# Patient Record
Sex: MALE | Race: WHITE | Employment: OTHER | URBAN - METROPOLITAN AREA
[De-identification: names, ages, dates, MRNs, and addresses within clinical notes are randomized per-mention and may not be internally consistent; named-entity substitution may affect disease eponyms.]

---

## 2017-10-04 PROBLEM — E66.9 MODERATE OBESITY: Status: ACTIVE | Noted: 2017-10-04

## 2017-10-04 PROBLEM — I25.10 CORONARY ARTERY DISEASE INVOLVING NATIVE CORONARY ARTERY OF NATIVE HEART WITHOUT ANGINA PECTORIS: Status: ACTIVE | Noted: 2017-10-04

## 2017-10-04 PROBLEM — I25.5 ISCHEMIC CARDIOMYOPATHY: Status: ACTIVE | Noted: 2017-10-04

## 2017-10-04 PROBLEM — E66.8 MODERATE OBESITY: Status: ACTIVE | Noted: 2017-10-04

## 2017-10-04 PROBLEM — E78.1 PURE HYPERGLYCERIDEMIA: Status: ACTIVE | Noted: 2017-10-04

## 2017-10-06 PROBLEM — J44.9 CHRONIC OBSTRUCTIVE PULMONARY DISEASE (HCC): Status: ACTIVE | Noted: 2017-10-06

## 2017-10-06 PROBLEM — E78.00 PURE HYPERCHOLESTEROLEMIA: Status: ACTIVE | Noted: 2017-10-06

## 2018-05-03 ENCOUNTER — HOSPITAL ENCOUNTER (OUTPATIENT)
Age: 58
Discharge: HOME | End: 2018-05-05
Payer: COMMERCIAL

## 2018-05-03 LAB — PROSTATE SPECIFIC ANTIGEN: 0.31 NG/ML (ref 0–4)

## 2018-05-03 PROCEDURE — G0103 PSA SCREENING: HCPCS

## 2018-07-16 RX ORDER — METOPROLOL SUCCINATE 25 MG/1
25 TABLET, EXTENDED RELEASE ORAL DAILY
Qty: 30 TABLET | Refills: 11 | Status: SHIPPED | OUTPATIENT
Start: 2018-07-16 | End: 2018-11-15 | Stop reason: SDUPTHER

## 2018-11-15 ENCOUNTER — OFFICE VISIT (OUTPATIENT)
Dept: CARDIOLOGY CLINIC | Age: 58
End: 2018-11-15
Payer: COMMERCIAL

## 2018-11-15 VITALS
DIASTOLIC BLOOD PRESSURE: 72 MMHG | HEART RATE: 70 BPM | WEIGHT: 266.2 LBS | SYSTOLIC BLOOD PRESSURE: 110 MMHG | RESPIRATION RATE: 18 BRPM | HEIGHT: 73 IN | BODY MASS INDEX: 35.28 KG/M2

## 2018-11-15 DIAGNOSIS — I51.9 LV DYSFUNCTION: ICD-10-CM

## 2018-11-15 DIAGNOSIS — I25.10 CORONARY ARTERY DISEASE INVOLVING NATIVE CORONARY ARTERY OF NATIVE HEART WITHOUT ANGINA PECTORIS: Primary | ICD-10-CM

## 2018-11-15 PROCEDURE — 99213 OFFICE O/P EST LOW 20 MIN: CPT | Performed by: CLINICAL NURSE SPECIALIST

## 2018-11-15 PROCEDURE — 93000 ELECTROCARDIOGRAM COMPLETE: CPT | Performed by: CLINICAL NURSE SPECIALIST

## 2018-11-15 RX ORDER — LISINOPRIL 10 MG/1
10 TABLET ORAL DAILY
Qty: 30 TABLET | Refills: 5 | Status: SHIPPED | OUTPATIENT
Start: 2018-11-15 | End: 2019-04-23 | Stop reason: SDUPTHER

## 2018-11-15 RX ORDER — TEMAZEPAM 15 MG/1
15 CAPSULE ORAL NIGHTLY PRN
COMMUNITY
End: 2021-04-20

## 2018-11-15 RX ORDER — METOPROLOL SUCCINATE 25 MG/1
25 TABLET, EXTENDED RELEASE ORAL DAILY
Qty: 30 TABLET | Refills: 5 | Status: SHIPPED | OUTPATIENT
Start: 2018-11-15 | End: 2019-04-23 | Stop reason: SDUPTHER

## 2018-11-15 RX ORDER — ATORVASTATIN CALCIUM 40 MG/1
40 TABLET, FILM COATED ORAL DAILY
Qty: 30 TABLET | Refills: 5 | Status: SHIPPED | OUTPATIENT
Start: 2018-11-15 | End: 2019-04-23 | Stop reason: SDUPTHER

## 2018-11-15 RX ORDER — IBUPROFEN 800 MG/1
800 TABLET ORAL EVERY 6 HOURS PRN
COMMUNITY
End: 2020-05-27

## 2019-02-08 ENCOUNTER — TELEPHONE (OUTPATIENT)
Dept: CARDIOLOGY CLINIC | Age: 59
End: 2019-02-08

## 2019-04-23 DIAGNOSIS — I51.9 LV DYSFUNCTION: ICD-10-CM

## 2019-04-23 DIAGNOSIS — I25.10 CORONARY ARTERY DISEASE INVOLVING NATIVE CORONARY ARTERY OF NATIVE HEART WITHOUT ANGINA PECTORIS: ICD-10-CM

## 2019-04-23 RX ORDER — ATORVASTATIN CALCIUM 40 MG/1
40 TABLET, FILM COATED ORAL DAILY
Qty: 30 TABLET | Refills: 11 | Status: SHIPPED
Start: 2019-04-23 | End: 2020-05-18 | Stop reason: SDUPTHER

## 2019-04-23 RX ORDER — METOPROLOL SUCCINATE 25 MG/1
25 TABLET, EXTENDED RELEASE ORAL DAILY
Qty: 30 TABLET | Refills: 11 | Status: SHIPPED
Start: 2019-04-23 | End: 2020-05-11

## 2019-04-23 RX ORDER — LISINOPRIL 10 MG/1
10 TABLET ORAL DAILY
Qty: 30 TABLET | Refills: 11 | Status: SHIPPED
Start: 2019-04-23 | End: 2020-05-18 | Stop reason: SDUPTHER

## 2019-06-03 ENCOUNTER — TELEPHONE (OUTPATIENT)
Dept: CARDIOLOGY CLINIC | Age: 59
End: 2019-06-03

## 2019-06-03 NOTE — TELEPHONE ENCOUNTER
Patient has coronary disease and is a definite candidate to continue aspirin and blood pressures are not concerning cause changes of therapy at this time.

## 2019-06-03 NOTE — TELEPHONE ENCOUNTER
Patient was seen by his PC, Dr Rachid Krishnan. He was told to check with his cardiologist to see if he still needed to continue taking ASA 81mg daily. There is a new study out and they advised it is not necessary for everyone to ASA. Also, he has noticed that his blood pressure runs \"abnormally low\"    He is wondering if he needs to take lisinopril? He took his pressure at the pharmacy machine and it has read 90/60, this was after walking to the back of the store. He does not have symptoms, no dizziness. He has a lot of trouble sleeping at night. He has a lot of stress and it keeps him awake. He was prescribed Restoril 15mg, he takes it prn. He was asking if he needs to be concerned about his blood pressure.     [phone]

## 2019-07-10 ENCOUNTER — OFFICE VISIT (OUTPATIENT)
Dept: PHYSICAL MEDICINE AND REHAB | Age: 59
End: 2019-07-10
Payer: COMMERCIAL

## 2019-07-10 VITALS — HEIGHT: 73 IN | WEIGHT: 260 LBS | BODY MASS INDEX: 34.46 KG/M2

## 2019-07-10 DIAGNOSIS — R20.2 NUMBNESS AND TINGLING IN LEFT HAND: Primary | ICD-10-CM

## 2019-07-10 DIAGNOSIS — R20.0 NUMBNESS AND TINGLING IN LEFT HAND: Primary | ICD-10-CM

## 2019-07-10 PROCEDURE — 3017F COLORECTAL CA SCREEN DOC REV: CPT | Performed by: PHYSICAL MEDICINE & REHABILITATION

## 2019-07-10 PROCEDURE — G8417 CALC BMI ABV UP PARAM F/U: HCPCS | Performed by: PHYSICAL MEDICINE & REHABILITATION

## 2019-07-10 PROCEDURE — G8598 ASA/ANTIPLAT THER USED: HCPCS | Performed by: PHYSICAL MEDICINE & REHABILITATION

## 2019-07-10 PROCEDURE — 95912 NRV CNDJ TEST 11-12 STUDIES: CPT | Performed by: PHYSICAL MEDICINE & REHABILITATION

## 2019-07-10 PROCEDURE — 95886 MUSC TEST DONE W/N TEST COMP: CPT | Performed by: PHYSICAL MEDICINE & REHABILITATION

## 2019-07-10 PROCEDURE — 4004F PT TOBACCO SCREEN RCVD TLK: CPT | Performed by: PHYSICAL MEDICINE & REHABILITATION

## 2019-07-10 PROCEDURE — 99202 OFFICE O/P NEW SF 15 MIN: CPT | Performed by: PHYSICAL MEDICINE & REHABILITATION

## 2019-07-10 PROCEDURE — G8427 DOCREV CUR MEDS BY ELIG CLIN: HCPCS | Performed by: PHYSICAL MEDICINE & REHABILITATION

## 2019-07-10 NOTE — PROGRESS NOTES
[address] [facility]  Electrodiagnostic Laboratory  *Accredited by the [address] [place] [address] with exemplary status  [address] [facility] [address]  Phone: [phone]  Fax: [phone]    Referring Provider: Serg Flores DO  Primary Care Physician: Aguilar Campuzano DO  Patient Name: Tata Gonzalez  Patient YOB: 1960  Gender: male  BMI: Body mass index is 34.3 kg/m². Height 6' 1\" (1.854 m), weight 260 lb (117.9 kg). 7/10/2019    Description of clinical problem:   Chief Complaint   Patient presents with    Hand Numbness     Left hand numbness when he rests his elbow on a surface      Pain No  Pain Score:   0 - No pain; Numbness/tingling  Intermittent; Weakness  No       Brief physical exam:   Sensory deficit No; Weakness No; Atrophy  No; Reflex abnormality  No    Sensory NCS      Nerve / Sites Rec. Site Peak Lat PP Amp Segments Distance Velocity Temp. ms µV  cm m/s °C   L Median - Digit II (Antidromic)      Palm Dig II 2.08 22.7 Palm - Dig II 7 48 32.6      Wrist Dig II 3.91 15.9 Wrist - Dig II 14 45 32.6   R Median - Digit II (Antidromic)      Palm Dig II 1.98 28.9 Palm - Dig II 7 56 33.6      Wrist Dig II 3.54 26.1 Wrist - Dig II 14 56 33.6   L Ulnar - Digit V (Antidromic)      Wrist Dig V 3.02 16.5 Wrist - Dig V 14 61 33.6   L Radial - Anatomical snuff box (Forearm)      Forearm Wrist 2.19 15.9 Forearm - Wrist 10 62 33.5   L Dorsal ulnar cutaneous - Hand dorsum (Forearm)      Forearm Hand dorsum 1.77 20.0 Forearm - Hand dorsum 8 57 33.6       Combined Sensory Index      Nerve / Sites Rec. Site Peak Lat NP Amp PP Amp Segments Dist. Peak Diff Temp.      ms µV µV  cm ms °C   L Median - CSI      Median Thumb 3.23 9.5 12.9 Median - Radial 10 0.94 33.5      Radial Thumb 2.29 6.2 6.9 Median - Ulnar 14        CSI     CSI  0.94*    R Median - CSI      Median Thumb 3.23 8.4 17.4 Median - Radial 10 0.99 33.2      Radial Thumb 2.24 5.9 6.7 Median - Ulnar 14        CSI     CSI  0.99*

## 2019-07-16 DIAGNOSIS — R20.0 NUMBNESS AND TINGLING IN LEFT HAND: ICD-10-CM

## 2019-07-16 DIAGNOSIS — R20.2 NUMBNESS AND TINGLING IN LEFT HAND: ICD-10-CM

## 2019-11-22 ENCOUNTER — HOSPITAL ENCOUNTER (OUTPATIENT)
Age: 59
Discharge: HOME | End: 2019-11-24
Payer: COMMERCIAL

## 2019-11-22 LAB — PROSTATE SPECIFIC ANTIGEN: 0.37 NG/ML (ref 0–4)

## 2019-11-22 PROCEDURE — G0103 PSA SCREENING: HCPCS

## 2020-05-11 RX ORDER — METOPROLOL SUCCINATE 25 MG/1
TABLET, EXTENDED RELEASE ORAL
Qty: 30 TABLET | Refills: 5 | OUTPATIENT
Start: 2020-05-11 | End: 2020-06-04

## 2020-05-18 RX ORDER — ATORVASTATIN CALCIUM 40 MG/1
40 TABLET, FILM COATED ORAL DAILY
Qty: 30 TABLET | Refills: 11 | Status: SHIPPED
Start: 2020-05-18 | End: 2021-05-19

## 2020-05-18 RX ORDER — LISINOPRIL 10 MG/1
10 TABLET ORAL DAILY
Qty: 30 TABLET | Refills: 11 | Status: SHIPPED
Start: 2020-05-18 | End: 2021-05-19

## 2020-05-27 ENCOUNTER — VIRTUAL VISIT (OUTPATIENT)
Dept: CARDIOLOGY CLINIC | Age: 60
End: 2020-05-27
Payer: COMMERCIAL

## 2020-05-27 VITALS
BODY MASS INDEX: 31.44 KG/M2 | HEART RATE: 70 BPM | WEIGHT: 245 LBS | SYSTOLIC BLOOD PRESSURE: 108 MMHG | DIASTOLIC BLOOD PRESSURE: 87 MMHG | HEIGHT: 74 IN

## 2020-05-27 PROCEDURE — 99214 OFFICE O/P EST MOD 30 MIN: CPT | Performed by: INTERNAL MEDICINE

## 2020-05-27 PROCEDURE — 3023F SPIROM DOC REV: CPT | Performed by: INTERNAL MEDICINE

## 2020-05-27 PROCEDURE — 4004F PT TOBACCO SCREEN RCVD TLK: CPT | Performed by: INTERNAL MEDICINE

## 2020-05-27 PROCEDURE — 3017F COLORECTAL CA SCREEN DOC REV: CPT | Performed by: INTERNAL MEDICINE

## 2020-05-27 PROCEDURE — G8417 CALC BMI ABV UP PARAM F/U: HCPCS | Performed by: INTERNAL MEDICINE

## 2020-05-27 PROCEDURE — G8926 SPIRO NO PERF OR DOC: HCPCS | Performed by: INTERNAL MEDICINE

## 2020-05-27 PROCEDURE — G8427 DOCREV CUR MEDS BY ELIG CLIN: HCPCS | Performed by: INTERNAL MEDICINE

## 2020-05-27 RX ORDER — NITROGLYCERIN 0.4 MG/1
0.4 TABLET SUBLINGUAL EVERY 5 MIN PRN
Qty: 25 TABLET | Refills: 3 | Status: SHIPPED
Start: 2020-05-27 | End: 2020-05-27 | Stop reason: ALTCHOICE

## 2020-05-27 RX ORDER — SOY ISOFLAVONE 40 MG
500 TABLET ORAL DAILY
COMMUNITY
End: 2021-04-20

## 2020-05-27 RX ORDER — CLONAZEPAM 2 MG/1
2 TABLET ORAL NIGHTLY PRN
COMMUNITY
End: 2021-04-20

## 2020-05-27 NOTE — PROGRESS NOTES
OUTPATIENT CARDIOLOGY FOLLOW-UP    Name: Gaby Coyne    Age: 61 y.o. Primary Care Physician: Morgan Berg DO    Date of Service: 5/27/2020    Chief Complaint: Coronary atherosclerosis with stable angina, ischemic cardiomyopathy, chronic obstructive lung disease moderate obesity    Interim History: Since his most recent evaluation of approximately 18 months earlier by Lynn Peterson, the patient has developed symptoms of both exertional and nonexertionally mediated precordial chest discomfort described as a pressure-like sensation no radiation or associated ischemic equivalents and symptoms lasting less than 5 minutes duration. The symptoms have been noted for greater than 1 year without reporting to our attention and have not become significantly progressive. He relates no changes of his functional capabilities into his credit with lifestyle modification has lost approximately 20 to 25 pounds while remaining moderately obese. Unfortunately, he has been unable to terminate tobacco consumption with active consumption in excess of 1/2 pack of cigarettes on a daily basis. He relates no additional manifestations of decompensated left ventricular systolic dysfunction or volume overload and denies arrhythmia related symptoms. He is normotensive on the day of his assessment with only occasional interim blood pressure determinations and on one occasion relative hypotension absence of near syncope or loss of consciousness. He continues to experience difficulty related to reported prostatic hypertrophy in spite of his existing medical regimen inclusive of daily tadalafil(Cialis). Review of Systems: The remainder of a complete multisystem review including consitutional, central nervous, respiratory, circulatory, gastrointestinal, genitourinary, endocrinologic, hematologic, musculoskeletal and psychiatric are negative.     Past Medical History:  Past Medical History:   Diagnosis Date    Arthritis     BUN 13 06/07/2012     06/07/2012    K 5.0 06/07/2012     06/07/2012    CO2 34 (H) 06/07/2012     No results found for: BNP  Lab Results   Component Value Date    WBC 10.5 06/07/2012    RBC 5.13 06/07/2012    HGB 16.8 06/07/2012    HCT 49.3 06/07/2012    MCV 96.0 06/07/2012    MCH 32.7 06/07/2012    MCHC 34.0 06/07/2012    RDW 14.8 06/07/2012     06/07/2012    MPV 7.6 06/07/2012     No results for input(s): ALKPHOS, ALT, AST, PROT, BILITOT, BILIDIR, LABALBU in the last 72 hours. No results found for: MG  No results found for: PROTIME, INR  Lab Results   Component Value Date    TSH 0.919 06/07/2012     No components found for: CHLPL  Lab Results   Component Value Date    TRIG 204 (H) 11/19/2011     Lab Results   Component Value Date    HDL 40.0 (A) 11/19/2011     Lab Results   Component Value Date    LDLCALC 125 (H) 11/19/2011       Cardiac Tests: none available      ASSESSMENT / PLAN: On a clinical basis, the patient presents with symptoms of precordial chest discomfort in the face of his known coronary atherosclerosis and ischemic cardiomyopathy compatible with that of stable angina. Presently, I have discussed this with him and had initially planned to add sublingual nitroglycerin to his medical regimen but based on his daily use of tadalafil(Cialis) for issues related to his prostate, this is contraindicated and have presently maintained his existing medical regimen. He relates the lack of efficacy of this and I have recommended further discussion of alternatives associated with the discontinuation of this component of therapy which would make nitrate administration as necessary and appropriate addition to his medical regimen. Predominately on the basis of prognostication, I have recommended the performance of a gated vasodilator myocardial perfusion imaging study and will provide additional recommendations as appropriate following completion.   I have extensively discussed his needs of appropriate lifestyle modification both related to continued weight reduction to benefit diastolic cardiac performance and to reduce his risk of the development of obstructive sleep apnea well is that of smoking cessation both the benefit reducing risk of further adverse effects in his chronic obstructive lung disease as well as that of reducing risk of progressive atherosclerosis. I presently plan his clinical reassessment in approximately 3 months and would happily reevaluate him in the interim should additional cardiovascular difficulties or concerns arise. The patient's current medication list, allergies, problem list and results of all previously ordered testing were reviewed at today's visit. Follow-up office visit in 3 months      Note: This report was completed using computerized voice recognition software. Every effort has been made to ensure accuracy, however; and invert and computerized transcription errors may be present. Nuha Nj. Ladonna Perkins, [address] [place] [address] Cardiology      Pursuant to the emergency declaration under the [facility], [facility] waiver authority and the [employer] Act, this virtual visit was conducted, with patient's consent, to reduce the patient's risk of exposure to COVID-19 and provide continuity of care for an established patient. Services were provided through a video synchronous discussion virtually to substitute for in-person clinic visit.

## 2020-06-04 RX ORDER — METOPROLOL SUCCINATE 25 MG/1
TABLET, EXTENDED RELEASE ORAL
Qty: 90 TABLET | Refills: 3 | Status: SHIPPED
Start: 2020-06-04 | End: 2021-04-20

## 2020-06-10 ENCOUNTER — TELEPHONE (OUTPATIENT)
Dept: CARDIOLOGY | Age: 60
End: 2020-06-10

## 2020-07-14 ENCOUNTER — TELEPHONE (OUTPATIENT)
Dept: CARDIOLOGY | Age: 60
End: 2020-07-14

## 2020-07-14 NOTE — TELEPHONE ENCOUNTER
07/14/20 1337 Reminder Call for [address] Test 07/21/20 @ 21  Message included instructions for stress Procedures and included COVID check list.  Encouraged Javon Mack to return call.

## 2020-07-14 NOTE — TELEPHONE ENCOUNTER
07/14/20  1 Kailash Arzola returned call to cancel scheduled stress test 07/21/20 @ 1030 due to problems of establishing new Physician  For pain Management. Previously medications had been adjusted and he is experiencing  Bodily pain and lack of sleep (expressed concern about driving to appointment).   [address] notified and  Plans to forward information to Dr. Alexandru Hill

## 2020-09-09 ENCOUNTER — TELEPHONE (OUTPATIENT)
Dept: CARDIOLOGY | Age: 60
End: 2020-09-09

## 2020-09-09 NOTE — TELEPHONE ENCOUNTER
Called pt to see if he wanted to schedule stress and he said he has to see a ne pain management dr first. Will call back

## 2020-10-12 ENCOUNTER — TELEPHONE (OUTPATIENT)
Dept: CARDIOLOGY CLINIC | Age: 60
End: 2020-10-12

## 2020-10-12 NOTE — TELEPHONE ENCOUNTER
Patient called. Has appointment for 10/13/20 but has stress test scheduled for 10/15/20. The OV was cancelled. He was advised that we will call him with results of his stress test and Dr. Tommy Coates will let us know when he needs seen again.

## 2020-10-13 ENCOUNTER — TELEPHONE (OUTPATIENT)
Dept: CARDIOLOGY | Age: 60
End: 2020-10-13

## 2020-10-13 NOTE — TELEPHONE ENCOUNTER
Left message on patient's voice mail reminding him of Lexiscan stress test on October 15, 2020 at 1030.  Instructions for test left on voice mail and asked patient to call in to review COVID-19 preprocedure checklist.

## 2020-10-14 ENCOUNTER — TELEPHONE (OUTPATIENT)
Dept: CARDIOLOGY | Age: 60
End: 2020-10-14

## 2020-10-14 NOTE — TELEPHONE ENCOUNTER
Patient cancelled stress test for tomorrow due to lack of sleep and medication issues. Patient stated he would call back to reschedule.

## 2020-12-15 ENCOUNTER — TELEPHONE (OUTPATIENT)
Dept: CARDIOLOGY CLINIC | Age: 60
End: 2020-12-15

## 2020-12-15 NOTE — TELEPHONE ENCOUNTER
Was recently started on Lyrica. Wants to make sure that it is okay to take with his heart medications.

## 2020-12-15 NOTE — TELEPHONE ENCOUNTER
Called patient back and informed him of Dr. Darek Martin' response. Patient stated that he does have his stress test scheduled for 12/28.

## 2020-12-15 NOTE — TELEPHONE ENCOUNTER
Of note, management regarding this as have no knowledge of the indications for its need.   In review of his requests, have seen that he has continued to postpone recommended stress test and would suggest he reschedule and proceed

## 2020-12-23 ENCOUNTER — TELEPHONE (OUTPATIENT)
Dept: CARDIOLOGY | Age: 60
End: 2020-12-23

## 2020-12-23 NOTE — TELEPHONE ENCOUNTER
Spoke with patient reminder of appointment on 12/28/20 at 10:30 for a Pharmacological stress test in structions and COVID checklist reviewed patient confirmed appointment.

## 2020-12-28 ENCOUNTER — TELEPHONE (OUTPATIENT)
Dept: CARDIOLOGY | Age: 60
End: 2020-12-28

## 2020-12-28 NOTE — TELEPHONE ENCOUNTER
Patient called cancelling stress test for the third time today due to unable to sleep all night, patient asked to be reschedule in January. Explained to patient that will be the last time to reschedule.

## 2021-01-12 ENCOUNTER — TELEPHONE (OUTPATIENT)
Dept: CARDIOLOGY | Age: 61
End: 2021-01-12

## 2021-01-13 ENCOUNTER — HOSPITAL ENCOUNTER (OUTPATIENT)
Dept: CARDIOLOGY | Age: 61
Discharge: HOME | End: 2021-01-13
Payer: COMMERCIAL

## 2021-01-13 VITALS
DIASTOLIC BLOOD PRESSURE: 60 MMHG | TEMPERATURE: 96.9 F | BODY MASS INDEX: 31.44 KG/M2 | RESPIRATION RATE: 20 BRPM | SYSTOLIC BLOOD PRESSURE: 80 MMHG | WEIGHT: 245 LBS | HEIGHT: 74 IN | HEART RATE: 77 BPM

## 2021-01-13 PROCEDURE — 78452 HT MUSCLE IMAGE SPECT MULT: CPT

## 2021-01-13 PROCEDURE — 3430000000 HC RX DIAGNOSTIC RADIOPHARMACEUTICAL: Performed by: INTERNAL MEDICINE

## 2021-01-13 PROCEDURE — 93017 CV STRESS TEST TRACING ONLY: CPT

## 2021-01-13 PROCEDURE — 2580000003 HC RX 258: Performed by: INTERNAL MEDICINE

## 2021-01-13 PROCEDURE — 6360000002 HC RX W HCPCS: Performed by: INTERNAL MEDICINE

## 2021-01-13 PROCEDURE — A9502 TC99M TETROFOSMIN: HCPCS | Performed by: INTERNAL MEDICINE

## 2021-01-13 RX ORDER — SODIUM CHLORIDE 0.9 % (FLUSH) 0.9 %
10 SYRINGE (ML) INJECTION PRN
Status: DISCONTINUED | OUTPATIENT
Start: 2021-01-13 | End: 2021-01-14 | Stop reason: HOSPADM

## 2021-01-13 RX ADMIN — TETROFOSMIN 34 MILLICURIE: 0.23 INJECTION, POWDER, LYOPHILIZED, FOR SOLUTION INTRAVENOUS at 12:42

## 2021-01-13 RX ADMIN — SODIUM CHLORIDE, PRESERVATIVE FREE 10 ML: 5 INJECTION INTRAVENOUS at 10:32

## 2021-01-13 RX ADMIN — TETROFOSMIN 10.2 MILLICURIE: 0.23 INJECTION, POWDER, LYOPHILIZED, FOR SOLUTION INTRAVENOUS at 10:32

## 2021-01-13 RX ADMIN — REGADENOSON 0.4 MG: 0.08 INJECTION, SOLUTION INTRAVENOUS at 12:42

## 2021-01-13 RX ADMIN — SODIUM CHLORIDE, PRESERVATIVE FREE 10 ML: 5 INJECTION INTRAVENOUS at 12:42

## 2021-01-13 RX ADMIN — SODIUM CHLORIDE, PRESERVATIVE FREE 10 ML: 5 INJECTION INTRAVENOUS at 12:40

## 2021-01-13 NOTE — PROCEDURES
[address] [facility] Karly alyse [facility], [address] [place] [address]  [phone]                 Pharmacologic Stress Nuclear Gated SPECT Study    Name: Donna Jc Account Number: [de-identified]    :  1960          Sex: male         Date of Study:  2021    Height: 6' 2\" (188 cm)         Weight: 245 lb (111.1 kg)     Ordering Provider: Joaquina Koehler          PCP: Jo Cabello DO      Cardiologist: Joaquina Koehler             Interpreting Physician: Jona Arboleda  _________________________________________________________________________________    Indication: Chest pain, CAD   Evaluation of extent and severity of coronary artery disease    Clinical History:   Patient has prior history of coronary artery disease. Resting ECG:    Sinus rhythm, Old  anterior MI    Procedure:   Pharmacologic stress testing was performed with regadenoson 0.4 mg for 15 seconds. The heart rate was 67 at baseline and carlos to 92 beats during the infusion. The blood pressure at baseline was 80/60 and blood pressure at the end of infusion was 98/60. Blood pressure response was hypotensive during the rest and stress procedure, asymtomatic - Pt legs elevated and did isometric exercises during Lexiscan injection. The patient tolerated the infusion well. ECG during the infusion showed no ischemia, occ. PVCs noted. IMAGING: Myocardial perfusion imaging was performed at rest 30-35 minutes following the intravenous injection of 10.9 mCi of (Tc-tetrofosmin) followed by 10 ml of Normal Saline. As per infusion protocol, the patient was injected intravenously with 34.0 mCi of (Tc-tetrofosmin) followed by 10 ml of Normal Saline. Gated post-stress tomographic imaging was performed 45 minutes after stress. FINDINGS: The overall quality of the study was good.      Left ventricular cavity size was noted to be normal.    Rotational analog analysis demonstrated no patient motion or abnormal extracardiac radioactivity. A severe defect was present in the mid inferior wall(s) that was  large size on the post regadeson images      The resting images are reveal partial reversibility. Gated SPECT left ventricular ejection fraction was calculated to be 60% with likely hypokinesis of the inferior wall. Impression:    1. Electrocardiographically normal regadenoson infusion with a clinically non-ischemic response  2. Myocardial perfusion imaging was abnormal.    The abnormality was a a large sized partially reversible defect in the inferior wall  3. Overall left ventricular systolic function was normal, 60% with likely hypokinesis of the inferior wall. 4.   Intermediate risk general pharmacologic stress test.    Compared to prior test from June 2017 - which showed fixed inferior wall defect with EF 40%. Thank you for sending your patient to this [employer].      Electronically signed by Jorge Neil MD on 1/14/2021 at 8:08 AM

## 2021-01-14 ENCOUNTER — TELEPHONE (OUTPATIENT)
Dept: CARDIOLOGY CLINIC | Age: 61
End: 2021-01-14

## 2021-01-14 NOTE — TELEPHONE ENCOUNTER
----- Message from Tnio Pathak MD sent at 1/14/2021  8:50 AM EST -----  Please notify patient that stress test shows evidence of known previous artery disease with no evidence to suggest progression.   Continue as directed and follow-up as scheduled    Called pt re above

## 2021-04-20 ENCOUNTER — OFFICE VISIT (OUTPATIENT)
Dept: CARDIOLOGY CLINIC | Age: 61
End: 2021-04-20
Payer: COMMERCIAL

## 2021-04-20 VITALS
SYSTOLIC BLOOD PRESSURE: 128 MMHG | DIASTOLIC BLOOD PRESSURE: 60 MMHG | HEART RATE: 78 BPM | HEIGHT: 74 IN | BODY MASS INDEX: 32.6 KG/M2 | WEIGHT: 254 LBS

## 2021-04-20 DIAGNOSIS — I25.10 CORONARY ARTERY DISEASE INVOLVING NATIVE CORONARY ARTERY OF NATIVE HEART WITHOUT ANGINA PECTORIS: Primary | ICD-10-CM

## 2021-04-20 DIAGNOSIS — E66.8 MODERATE OBESITY: ICD-10-CM

## 2021-04-20 DIAGNOSIS — J44.9 CHRONIC OBSTRUCTIVE PULMONARY DISEASE, UNSPECIFIED COPD TYPE (HCC): ICD-10-CM

## 2021-04-20 DIAGNOSIS — I25.5 ISCHEMIC CARDIOMYOPATHY: ICD-10-CM

## 2021-04-20 DIAGNOSIS — E78.00 PURE HYPERCHOLESTEROLEMIA: ICD-10-CM

## 2021-04-20 PROCEDURE — 3023F SPIROM DOC REV: CPT | Performed by: INTERNAL MEDICINE

## 2021-04-20 PROCEDURE — G8417 CALC BMI ABV UP PARAM F/U: HCPCS | Performed by: INTERNAL MEDICINE

## 2021-04-20 PROCEDURE — G8427 DOCREV CUR MEDS BY ELIG CLIN: HCPCS | Performed by: INTERNAL MEDICINE

## 2021-04-20 PROCEDURE — 99215 OFFICE O/P EST HI 40 MIN: CPT | Performed by: INTERNAL MEDICINE

## 2021-04-20 PROCEDURE — 3017F COLORECTAL CA SCREEN DOC REV: CPT | Performed by: INTERNAL MEDICINE

## 2021-04-20 PROCEDURE — G8926 SPIRO NO PERF OR DOC: HCPCS | Performed by: INTERNAL MEDICINE

## 2021-04-20 PROCEDURE — 93000 ELECTROCARDIOGRAM COMPLETE: CPT | Performed by: INTERNAL MEDICINE

## 2021-04-20 PROCEDURE — 4004F PT TOBACCO SCREEN RCVD TLK: CPT | Performed by: INTERNAL MEDICINE

## 2021-04-20 RX ORDER — METOPROLOL SUCCINATE 25 MG/1
25 TABLET, EXTENDED RELEASE ORAL DAILY
Qty: 90 TABLET | Refills: 3 | Status: SHIPPED
Start: 2021-04-20 | End: 2022-05-31

## 2021-04-20 RX ORDER — TEMAZEPAM 30 MG/1
30 CAPSULE ORAL NIGHTLY PRN
COMMUNITY
Start: 2020-10-28

## 2021-04-20 RX ORDER — TADALAFIL 5 MG/1
5 TABLET ORAL DAILY
COMMUNITY
Start: 2020-10-28 | End: 2022-03-07 | Stop reason: ALTCHOICE

## 2021-04-20 NOTE — PROGRESS NOTES
OUTPATIENT CARDIOLOGY FOLLOW-UP    Name: Carissa Og    Age: 61 y.o. Primary Care Physician: Claudia Feliz MD    Date of Service: 4/20/2021    Chief Complaint: Coronary atherosclerosis, chronic obstructive lung disease, hypertension, moderate obesity    Interim History: Since his previous assessment, the patient has remained compensated from a cardiovascular standpoint and denies interim symptoms of anginal-like chest discomfort or other ischemic equivalents. He is noted no manifestations of decompensated left ventricular systolic dysfunction or volume overload nor arrhythmia related manifestations. Unfortunately, he has made no progress in lifestyle modification either related to tobacco consumption with active consumption of at least 1 pack of cigarettes on a daily basis accompanied by that of a 10 pound weight gain. In spite of this, he remains normotensive. He continues to experience reported significant discomfort in the face of his chronic pain syndrome and reports associated insomnia. Review of Systems: The remainder of a complete multisystem review including consitutional, central nervous, respiratory, circulatory, gastrointestinal, genitourinary, endocrinologic, hematologic, musculoskeletal and psychiatric are negative. Past Medical History:  Past Medical History:   Diagnosis Date    Arthritis     CAD (coronary artery disease)     Gout     Hiatal hernia     Hyperlipidemia     Hypertension     Spinal stenosis        Past Surgical History:  Past Surgical History:   Procedure Laterality Date    CARDIAC CATHETERIZATION  07/05/2017    [facility]-Chronic total occlusion of RCA with collaterals. Hypokinesis of inferior wall with overall mildly reduced left ventricular systolic function.     CATARACT REMOVAL WITH IMPLANT Left 8 19 14    CATARACT REMOVAL WITH IMPLANT Right 12 1 15    PILONIDAL CYST EXCISION  age 12    WISDOM TOOTH EXTRACTION         Family History:  History reviewed. No pertinent family history. Social History:  Social History     Socioeconomic History    Marital status:      Spouse name: Not on file    Number of children: Not on file    Years of education: Not on file    Highest education level: Not on file   Occupational History    Not on file   Social Needs    Financial resource strain: Not on file    Food insecurity     Worry: Not on file     Inability: Not on file   [employer] needs     Medical: Not on file     Non-medical: Not on file   Tobacco Use    Smoking status: Current Every Day Smoker     Packs/day: 1.00     Years: 33.00     Pack years: 33.00     Types: Cigarettes    Smokeless tobacco: Never Used   Substance and Sexual Activity    Alcohol use: No     Comment: 2 cups coffee daily    Drug use: No    Sexual activity: Yes     Partners: Female   Lifestyle    Physical activity     Days per week: Not on file     Minutes per session: Not on file    Stress: Not on file   Relationships    Social connections     Talks on phone: Not on file     Gets together: Not on file     Attends Congregation service: Not on file     Active member of club or organization: Not on file     Attends meetings of clubs or organizations: Not on file     Relationship status: Not on file    Intimate partner violence     Fear of current or ex partner: Not on file     Emotionally abused: Not on file     Physically abused: Not on file     Forced sexual activity: Not on file   Other Topics Concern    Not on file   Social History Narrative    2 cups coffee daily; 2 cans pop daily       Allergies:  No Known Allergies    Current Medications:  Current Outpatient Medications   Medication Sig Dispense Refill    temazepam (RESTORIL) 15 MG capsule Take 15 mg by mouth nightly as needed.       tadalafil (CIALIS) 5 MG tablet Take 5 mg by mouth daily      metoprolol succinate (TOPROL XL) 25 MG extended release tablet Take 1 tablet by mouth daily 90 tablet 3    lisinopril development of obstructive sleep apnea with associated adverse cardiovascular effects. Ongoing aggressive risk factor modification of blood pressure and serum lipids will remain essential to reducing risk of future atherosclerotic development. In the course of his assessment I additionally have discussed the inappropriateness of obtaining a coronary calcium score in the face of existing documented coronary atherosclerosis as providing no incremental benefit to that of his previous assessments. I presently plan his clinical reassessment in approximately 1 year and would happily reassess him in the interim should additional cardiovascular difficulties or concerns arise. The patient's current medication list, allergies, problem list and results of all previously ordered testing were reviewed at today's visit. Follow-up office visit in 1 year      Note: This report was completed using computerized voice recognition software. Every effort has been made to ensure accuracy, however; inadvertent computerized transcription errors may be present. Lenin Lerner.  Swapna Vasquez, [address] [facility] Cardiology

## 2021-05-19 DIAGNOSIS — I25.10 CORONARY ARTERY DISEASE INVOLVING NATIVE CORONARY ARTERY OF NATIVE HEART WITHOUT ANGINA PECTORIS: ICD-10-CM

## 2021-05-19 DIAGNOSIS — I51.9 LV DYSFUNCTION: ICD-10-CM

## 2021-05-19 RX ORDER — LISINOPRIL 10 MG/1
TABLET ORAL
Qty: 30 TABLET | Refills: 11 | Status: SHIPPED
Start: 2021-05-19 | End: 2022-05-13

## 2021-05-19 RX ORDER — LISINOPRIL 10 MG/1
TABLET ORAL
Qty: 30 TABLET | Refills: 11 | Status: SHIPPED
Start: 2021-05-19 | End: 2021-05-19 | Stop reason: SDUPTHER

## 2021-05-19 RX ORDER — ATORVASTATIN CALCIUM 40 MG/1
TABLET, FILM COATED ORAL
Qty: 30 TABLET | Refills: 11 | Status: SHIPPED
Start: 2021-05-19 | End: 2021-05-19 | Stop reason: SDUPTHER

## 2021-05-19 RX ORDER — ATORVASTATIN CALCIUM 40 MG/1
TABLET, FILM COATED ORAL
Qty: 30 TABLET | Refills: 11 | Status: SHIPPED
Start: 2021-05-19 | End: 2022-05-13

## 2021-06-28 ENCOUNTER — TELEPHONE (OUTPATIENT)
Dept: CARDIOLOGY CLINIC | Age: 61
End: 2021-06-28

## 2021-06-28 NOTE — TELEPHONE ENCOUNTER
Spoke with Harish Monroe. Dr. Squires Doctor (pain management) for his back. Getting cold now, not getting any sleep. Is in constant pain-his pain management doctor retired and he was taken off Oxycodone. Giles Erickson will call tomorrow.

## 2021-06-28 NOTE — TELEPHONE ENCOUNTER
Medication in question is not of a cardiovascular origin and do not know who prescribed or for what reason.   He should contact the prescribing physician with questions in light of any cardiovascular issues

## 2021-06-28 NOTE — TELEPHONE ENCOUNTER
Pt called and he is now on neurontin. One at day and one at night. He wants to know if he should be taking it.  He can be reached at [phone]

## 2022-02-08 PROBLEM — G89.4 PAIN SYNDROME, CHRONIC: Status: ACTIVE | Noted: 2022-02-08

## 2022-03-07 ENCOUNTER — OFFICE VISIT (OUTPATIENT)
Dept: CARDIOLOGY CLINIC | Age: 62
End: 2022-03-07
Payer: COMMERCIAL

## 2022-03-07 VITALS
WEIGHT: 247 LBS | BODY MASS INDEX: 31.7 KG/M2 | RESPIRATION RATE: 16 BRPM | HEART RATE: 74 BPM | DIASTOLIC BLOOD PRESSURE: 78 MMHG | SYSTOLIC BLOOD PRESSURE: 138 MMHG | HEIGHT: 74 IN

## 2022-03-07 DIAGNOSIS — I25.10 CORONARY ARTERY DISEASE INVOLVING NATIVE CORONARY ARTERY OF NATIVE HEART WITHOUT ANGINA PECTORIS: Primary | ICD-10-CM

## 2022-03-07 DIAGNOSIS — E66.8 MODERATE OBESITY: ICD-10-CM

## 2022-03-07 DIAGNOSIS — G89.4 PAIN SYNDROME, CHRONIC: ICD-10-CM

## 2022-03-07 DIAGNOSIS — J44.9 CHRONIC OBSTRUCTIVE PULMONARY DISEASE, UNSPECIFIED COPD TYPE (HCC): ICD-10-CM

## 2022-03-07 DIAGNOSIS — E78.00 PURE HYPERCHOLESTEROLEMIA: ICD-10-CM

## 2022-03-07 DIAGNOSIS — Z01.810 PREOPERATIVE CARDIOVASCULAR EXAMINATION: ICD-10-CM

## 2022-03-07 DIAGNOSIS — I25.5 ISCHEMIC CARDIOMYOPATHY: ICD-10-CM

## 2022-03-07 PROCEDURE — G8484 FLU IMMUNIZE NO ADMIN: HCPCS | Performed by: INTERNAL MEDICINE

## 2022-03-07 PROCEDURE — 99214 OFFICE O/P EST MOD 30 MIN: CPT | Performed by: INTERNAL MEDICINE

## 2022-03-07 PROCEDURE — 3017F COLORECTAL CA SCREEN DOC REV: CPT | Performed by: INTERNAL MEDICINE

## 2022-03-07 PROCEDURE — 3023F SPIROM DOC REV: CPT | Performed by: INTERNAL MEDICINE

## 2022-03-07 PROCEDURE — 4004F PT TOBACCO SCREEN RCVD TLK: CPT | Performed by: INTERNAL MEDICINE

## 2022-03-07 PROCEDURE — 93000 ELECTROCARDIOGRAM COMPLETE: CPT | Performed by: INTERNAL MEDICINE

## 2022-03-07 PROCEDURE — G8417 CALC BMI ABV UP PARAM F/U: HCPCS | Performed by: INTERNAL MEDICINE

## 2022-03-07 PROCEDURE — G8427 DOCREV CUR MEDS BY ELIG CLIN: HCPCS | Performed by: INTERNAL MEDICINE

## 2022-03-07 RX ORDER — GABAPENTIN 300 MG/1
300 CAPSULE ORAL 3 TIMES DAILY
COMMUNITY

## 2022-03-07 RX ORDER — OMEPRAZOLE 40 MG/1
CAPSULE, DELAYED RELEASE ORAL
COMMUNITY
Start: 2022-02-18

## 2022-03-07 NOTE — PROGRESS NOTES
OUTPATIENT CARDIOLOGY FOLLOW-UP    Name: Marek Handy    Age: 64 y.o. Primary Care Physician: No primary care provider on file. Date of Service: 3/7/2022    Chief Complaint: Coronary atherosclerosis, ischemic cardiomyopathy, chronic obstructive lung disease, chronic pain syndrome, moderate obesity, preoperative cardiovascular evaluation    Interim History: Since his most recent evaluation, the patient remains compensated with a cardiovascular standpoint and denies interim symptoms of anginal-like chest discomfort or other ischemic equivalents, decompensated left ventricular stomach dysfunction or volume overload nor arrhythmia related manifestations. He continues to note symptoms of exertional dyspnea (functional class II) [facility] on the basis of his chronic obstructive lung disease and ongoing tobacco consumption in excess of three-quarter pack of cigarettes on a daily basis. He has developed symptomatic prostatic hypertrophy in spite of existing therapy with needs of surgical intervention. At the time of his assessment borderline stage I systolic hypertension is noted. Review of Systems: The remainder of a complete multisystem review including consitutional, central nervous, respiratory, circulatory, gastrointestinal, genitourinary, endocrinologic, hematologic, musculoskeletal and psychiatric are negative. Past Medical History:  Past Medical History:   Diagnosis Date    Arthritis     CAD (coronary artery disease)     Gout     Hiatal hernia     Hyperlipidemia     Hypertension     Spinal stenosis        Past Surgical History:  Past Surgical History:   Procedure Laterality Date    CARDIAC CATHETERIZATION  07/05/2017    [facility]-Chronic total occlusion of RCA with collaterals. Hypokinesis of inferior wall with overall mildly reduced left ventricular systolic function.     CATARACT REMOVAL WITH IMPLANT Left 8 19 14    CATARACT REMOVAL WITH IMPLANT Right 12 1 15    PILONIDAL CYST EXCISION  age 15    WISDOM TOOTH EXTRACTION         Family History:  History reviewed. No pertinent family history. Social History:  Social History     Socioeconomic History    Marital status:      Spouse name: Not on file    Number of children: Not on file    Years of education: Not on file    Highest education level: Not on file   Occupational History    Not on file   Tobacco Use    Smoking status: Current Every Day Smoker     Packs/day: 1.00     Years: 33.00     Pack years: 33.00     Types: Cigarettes    Smokeless tobacco: Never Used   Vaping Use    Vaping Use: Never used   Substance and Sexual Activity    Alcohol use: No     Comment: 2 cups coffee daily- pop daily    Drug use: No    Sexual activity: Yes     Partners: Female   Other Topics Concern    Not on file   Social History Narrative    2 cups coffee daily; 2 cans pop daily     Social Determinants of Health     Financial Resource Strain:     Difficulty of Paying Living Expenses: Not on file   Food Insecurity:     Worried About Running Out of Food in the Last Year: Not on file    Lamar of Food in the Last Year: Not on file   Transportation Needs:     Lack of Transportation (Medical): Not on file    Lack of Transportation (Non-Medical):  Not on file   Physical Activity:     Days of Exercise per Week: Not on file    Minutes of Exercise per Session: Not on file   Stress:     Feeling of Stress : Not on file   Social Connections:     Frequency of Communication with Friends and Family: Not on file    Frequency of Social Gatherings with Friends and Family: Not on file    Attends Orthodox Services: Not on file    Active Member of Clubs or Organizations: Not on file    Attends Club or Organization Meetings: Not on file    Marital Status: Not on file   Intimate Partner Violence:     Fear of Current or Ex-Partner: Not on file    Emotionally Abused: Not on file    Physically Abused: Not on file    Sexually Abused: Not on file Housing Stability:     Unable to Pay for Housing in the Last Year: Not on file    Number of Places Lived in the Last Year: Not on file    Unstable Housing in the Last Year: Not on file       Allergies:  No Known Allergies    Current Medications:  Current Outpatient Medications   Medication Sig Dispense Refill    omeprazole (PRILOSEC) 40 MG delayed release capsule take 1 (ONE) cap(s) orally once a day 30 day(s)      gabapentin (NEURONTIN) 300 MG capsule Take 300 mg by mouth 3 times daily.  lisinopril (PRINIVIL;ZESTRIL) 10 MG tablet TAKE 1 TABLET BY MOUTH EVERY DAY 30 tablet 11    atorvastatin (LIPITOR) 40 MG tablet TAKE 1 TABLET BY MOUTH ONCE DAILY 30 tablet 11    temazepam (RESTORIL) 30 MG capsule Take 30 mg by mouth nightly as needed.  metoprolol succinate (TOPROL XL) 25 MG extended release tablet Take 1 tablet by mouth daily 90 tablet 3    finasteride (PROSCAR) 5 MG tablet TAKE 1 TABLET BY MOUTH EVERY DAY  3    tamsulosin (FLOMAX) 0.4 MG capsule Take 0.8 mg by mouth       aspirin 81 MG tablet Take 81 mg by mouth daily      allopurinol (ZYLOPRIM) 300 MG tablet Take 300 mg by mouth daily.  tadalafil (CIALIS) 5 MG tablet Take 5 mg by mouth daily (Patient not taking: Reported on 3/7/2022)      CIALIS 5 MG tablet Taking generic sildenafil (Patient not taking: Reported on 3/7/2022)  11     No current facility-administered medications for this visit. Physical Exam:  /78   Pulse 74   Resp 16   Ht 6' 1.75\" (1.873 m)   Wt 247 lb (112 kg)   BMI 31.93 kg/m²   Wt Readings from Last 3 Encounters:   03/07/22 247 lb (112 kg)   04/20/21 254 lb (115.2 kg)   01/13/21 245 lb (111.1 kg)     The patient is awake, alert and in no discomfort or distress. No gross musculoskeletal deformity is present. No significant skin or nail changes are present. Gross examination of head, eyes, nose and throat are negative. Jugular venous pressure is normal and no carotid bruits are present.  Normal respiratory effort is noted with no accessory muscle usage present. Lung fields are clear to ascultation. Cardiac examination is notable for a regular rate and rhythm with no palpable thrill. No gallop rhythm or cardiac murmur are identified. A benign abdominal examination is present with the exception of obesity and no masses or organomegaly. Intact pulses are present throughout all extremities and no peripheral edema is present. No focal neurologic deficits are present. Laboratory Tests:  Lab Results   Component Value Date    CREATININE 1.0 06/07/2012    BUN 13 06/07/2012     06/07/2012    K 5.0 06/07/2012     06/07/2012    CO2 34 (H) 06/07/2012     No results found for: BNP  Lab Results   Component Value Date    WBC 10.5 06/07/2012    RBC 5.13 06/07/2012    HGB 16.8 06/07/2012    HCT 49.3 06/07/2012    MCV 96.0 06/07/2012    MCH 32.7 06/07/2012    MCHC 34.0 06/07/2012    RDW 14.8 06/07/2012     06/07/2012    MPV 7.6 06/07/2012     No results for input(s): ALKPHOS, ALT, AST, PROT, BILITOT, BILIDIR, LABALBU in the last 72 hours. No results found for: MG  No results found for: PROTIME, INR  Lab Results   Component Value Date    TSH 0.919 06/07/2012     No components found for: CHLPL  Lab Results   Component Value Date    TRIG 204 (H) 11/19/2011     Lab Results   Component Value Date    HDL 40.0 (A) 11/19/2011     Lab Results   Component Value Date    LDLCALC 125 (H) 11/19/2011       Cardiac Tests:  ECG: A resting electrocardiogram demonstrates evidence of sinus rhythm with nonspecific ST changes      ASSESSMENT / PLAN: On a clinical basis, the patient presently appears compensated from a cardiovascular standpoint and at present would be an acceptable candidate for his proposed prostatic surgical procedure with a low risk of perioperative ischemic events.   I would recommend cautious periprocedural fluid administration to reduce risk of iatrogenic volume overload and/or perioperative congestive heart failure. I have discussed this with he and his family at the time of his evaluation. I additionally extensively discussed his needs of lifestyle modification both related to smoking cessation to reduce risk of further adverse effects on his chronic obstructive lung disease as well as to reduce risk of further atherosclerotic progression in addition to weight reduction to benefit diastolic cardiac performance as well as reduce risk of the development of obstructive sleep apnea with associated adverse cardiovascular effects. Continued aggressive risk factor modification blood pressure and serum lipids will remain essential to reducing risk of future atherosclerotic development. I presently plan his clinical reassessment in 1 year would happily reevaluate him in the interim should additional cardiovascular difficulties or concerns arise. The patient's current medication list, allergies, problem list and results of all previously ordered testing were reviewed at today's visit. Follow-up office visit in 1 year      Note: This report was completed using computerized voice recognition software. Every effort has been made to ensure accuracy, however; inadvertent computerized transcription errors may be present. Clayton Paredes.  Fernanda Marshall, [address] [place] [address]    An electronic copy of this follow-up progress note was forwarded to Dr. Oskar Calabrese

## 2022-03-31 ENCOUNTER — TELEPHONE (OUTPATIENT)
Dept: CARDIOLOGY CLINIC | Age: 62
End: 2022-03-31

## 2022-03-31 NOTE — TELEPHONE ENCOUNTER
Pt needs a cardiac clearance for a prostate uplift. It can be faxed to [phone].  The surgery is April 20th with Dr. Ignacio Valadez

## 2022-04-01 NOTE — TELEPHONE ENCOUNTER
This was already addressed at time of last follow-up with recommendations contained in notes from assessment

## 2022-05-12 DIAGNOSIS — I51.9 LV DYSFUNCTION: ICD-10-CM

## 2022-05-12 DIAGNOSIS — I25.10 CORONARY ARTERY DISEASE INVOLVING NATIVE CORONARY ARTERY OF NATIVE HEART WITHOUT ANGINA PECTORIS: ICD-10-CM

## 2022-05-13 RX ORDER — LISINOPRIL 10 MG/1
TABLET ORAL
Qty: 30 TABLET | Refills: 11 | Status: SHIPPED | OUTPATIENT
Start: 2022-05-13

## 2022-05-13 RX ORDER — ATORVASTATIN CALCIUM 40 MG/1
TABLET, FILM COATED ORAL
Qty: 30 TABLET | Refills: 11 | Status: SHIPPED | OUTPATIENT
Start: 2022-05-13

## 2022-05-31 RX ORDER — METOPROLOL SUCCINATE 25 MG/1
TABLET, EXTENDED RELEASE ORAL
Qty: 90 TABLET | Refills: 3 | Status: SHIPPED | OUTPATIENT
Start: 2022-05-31

## 2023-03-06 ENCOUNTER — OFFICE VISIT (OUTPATIENT)
Dept: CARDIOLOGY CLINIC | Age: 63
End: 2023-03-06
Payer: COMMERCIAL

## 2023-03-06 VITALS
SYSTOLIC BLOOD PRESSURE: 124 MMHG | HEIGHT: 73 IN | HEART RATE: 90 BPM | BODY MASS INDEX: 31.01 KG/M2 | DIASTOLIC BLOOD PRESSURE: 70 MMHG | RESPIRATION RATE: 16 BRPM | WEIGHT: 234 LBS

## 2023-03-06 DIAGNOSIS — E78.00 PURE HYPERCHOLESTEROLEMIA: ICD-10-CM

## 2023-03-06 DIAGNOSIS — I25.10 CORONARY ARTERY DISEASE INVOLVING NATIVE CORONARY ARTERY OF NATIVE HEART WITHOUT ANGINA PECTORIS: Primary | ICD-10-CM

## 2023-03-06 DIAGNOSIS — G89.4 PAIN SYNDROME, CHRONIC: ICD-10-CM

## 2023-03-06 DIAGNOSIS — I25.5 ISCHEMIC CARDIOMYOPATHY: ICD-10-CM

## 2023-03-06 DIAGNOSIS — J44.9 CHRONIC OBSTRUCTIVE PULMONARY DISEASE, UNSPECIFIED COPD TYPE (HCC): ICD-10-CM

## 2023-03-06 DIAGNOSIS — E66.8 MODERATE OBESITY: ICD-10-CM

## 2023-03-06 PROCEDURE — G8417 CALC BMI ABV UP PARAM F/U: HCPCS | Performed by: INTERNAL MEDICINE

## 2023-03-06 PROCEDURE — 93000 ELECTROCARDIOGRAM COMPLETE: CPT | Performed by: INTERNAL MEDICINE

## 2023-03-06 PROCEDURE — 3017F COLORECTAL CA SCREEN DOC REV: CPT | Performed by: INTERNAL MEDICINE

## 2023-03-06 PROCEDURE — 3023F SPIROM DOC REV: CPT | Performed by: INTERNAL MEDICINE

## 2023-03-06 PROCEDURE — G8427 DOCREV CUR MEDS BY ELIG CLIN: HCPCS | Performed by: INTERNAL MEDICINE

## 2023-03-06 PROCEDURE — 99214 OFFICE O/P EST MOD 30 MIN: CPT | Performed by: INTERNAL MEDICINE

## 2023-03-06 PROCEDURE — 1036F TOBACCO NON-USER: CPT | Performed by: INTERNAL MEDICINE

## 2023-03-06 PROCEDURE — G8484 FLU IMMUNIZE NO ADMIN: HCPCS | Performed by: INTERNAL MEDICINE

## 2023-03-06 NOTE — PROGRESS NOTES
OUTPATIENT CARDIOLOGY FOLLOW-UP    Name: Kiana Gonzales    Age: 58 y.o. Primary Care Physician: Yancik Houser DO    Date of Service: 3/6/2023    Chief Complaint: Coronary atherosclerosis, ischemic cardiomyopathy, chronic obstructive lung disease, chronic pain syndrome, moderate obesity    Interim History: Since his most recent evaluation, the patient remains compensated from a cardiovascular standpoint and denies interim symptoms of anginal-like chest discomfort or other ischemic equivalents, decompensated left ventricular systolic dysfunction or volume overload nor arrhythmia related manifestations. To his credit, he reports the termination of tobacco consumption as well as documentation of a 15 pound weight reduction. He relates periods of reported diminished diastolic blood pressures and is normotensive at the time of his present evaluation. Review of Systems: The remainder of a complete multisystem review including consitutional, central nervous, respiratory, circulatory, gastrointestinal, genitourinary, endocrinologic, hematologic, musculoskeletal and psychiatric are negative. Past Medical History:  Past Medical History:   Diagnosis Date    Arthritis     CAD (coronary artery disease)     Gout     Hiatal hernia     Hyperlipidemia     Hypertension     Spinal stenosis        Past Surgical History:  Past Surgical History:   Procedure Laterality Date    CARDIAC CATHETERIZATION  07/05/2017    [facility]-Chronic total occlusion of RCA with collaterals. Hypokinesis of inferior wall with overall mildly reduced left ventricular systolic function. CATARACT REMOVAL WITH IMPLANT Left 8 19 14    CATARACT REMOVAL WITH IMPLANT Right 12 1 15    PILONIDAL CYST EXCISION  age 12    WISDOM TOOTH EXTRACTION         Family History:  History reviewed. No pertinent family history.     Social History:  Social History     Socioeconomic History    Marital status:      Spouse name: Not on file    Number of children: Not on file    Years of education: Not on file    Highest education level: Not on file   Occupational History    Not on file   Tobacco Use    Smoking status: Former     Packs/day: 1.00     Years: 33.00     Pack years: 33.00     Types: Cigarettes     Quit date: 8/3/2022     Years since quittin.5    Smokeless tobacco: Never   Vaping Use    Vaping Use: Never used   Substance and Sexual Activity    Alcohol use: No     Comment: 2 cups coffee daily- pop daily    Drug use: No    Sexual activity: Yes     Partners: Female   Other Topics Concern    Not on file   Social History Narrative    2 cups coffee daily; 2 cans pop daily     Social Determinants of Health     Financial Resource Strain: Not on file   Food Insecurity: Not on file   Transportation Needs: Not on file   Physical Activity: Not on file   Stress: Not on file   Social Connections: Not on file   Intimate Partner Violence: Not on file   Housing Stability: Not on file       Allergies:  No Known Allergies    Current Medications:  Current Outpatient Medications   Medication Sig Dispense Refill    metoprolol succinate (TOPROL XL) 25 MG extended release tablet TAKE 1 TABLET BY MOUTH EVERY DAY 90 tablet 3    lisinopril (PRINIVIL;ZESTRIL) 10 MG tablet TAKE 1 TABLET BY MOUTH EVERY DAY 30 tablet 11    atorvastatin (LIPITOR) 40 MG tablet TAKE 1 TABLET BY MOUTH EVERY DAY 30 tablet 11    omeprazole (PRILOSEC) 40 MG delayed release capsule take 1 (ONE) cap(s) orally once a day 30 day(s)      gabapentin (NEURONTIN) 300 MG capsule Take 300 mg by mouth 3 times daily. temazepam (RESTORIL) 30 MG capsule Take 30 mg by mouth nightly as needed. finasteride (PROSCAR) 5 MG tablet TAKE 1 TABLET BY MOUTH EVERY DAY  3    tamsulosin (FLOMAX) 0.4 MG capsule Take 0.8 mg by mouth       aspirin 81 MG tablet Take 81 mg by mouth daily      allopurinol (ZYLOPRIM) 300 MG tablet Take 300 mg by mouth daily. No current facility-administered medications for this visit. Physical Exam:  /70   Pulse 90   Resp 16   Ht 6' 1\" (1.854 m)   Wt 234 lb (106.1 kg)   BMI 30.87 kg/m²   Wt Readings from Last 3 Encounters:   03/06/23 234 lb (106.1 kg)   03/07/22 247 lb (112 kg)   04/20/21 254 lb (115.2 kg)     The patient is awake, alert and in no discomfort or distress. No gross musculoskeletal deformity is present. No significant skin or nail changes are present. Gross examination of head, eyes, nose and throat are negative. Jugular venous pressure is normal and no carotid bruits are present. Normal respiratory effort is noted with no accessory muscle usage present. Lung fields are clear to ascultation. Cardiac examination is notable for a regular rate and rhythm with no palpable thrill. No gallop rhythm or cardiac murmur are identified. A benign abdominal examination is present with the exception of obesity and no masses or organomegaly. Intact pulses are present throughout all extremities and no peripheral edema is present. No focal neurologic deficits are present. Laboratory Tests:  Lab Results   Component Value Date    CREATININE 1.0 06/07/2012    BUN 13 06/07/2012     06/07/2012    K 5.0 06/07/2012     06/07/2012    CO2 34 (H) 06/07/2012     No results found for: BNP  Lab Results   Component Value Date/Time    WBC 10.5 06/07/2012 01:46 PM    RBC 5.13 06/07/2012 01:46 PM    HGB 16.8 06/07/2012 01:46 PM    HCT 49.3 06/07/2012 01:46 PM    MCV 96.0 06/07/2012 01:46 PM    MCH 32.7 06/07/2012 01:46 PM    MCHC 34.0 06/07/2012 01:46 PM    RDW 14.8 06/07/2012 01:46 PM     06/07/2012 01:46 PM    MPV 7.6 06/07/2012 01:46 PM     No results for input(s): ALKPHOS, ALT, AST, PROT, BILITOT, BILIDIR, LABALBU in the last 72 hours.   No results found for: MG  No results found for: PROTIME, INR  Lab Results   Component Value Date/Time    TSH 0.919 06/07/2012 01:46 PM     No components found for: CHLPL  Lab Results   Component Value Date    TRIG 204 (H) 11/19/2011 Lab Results   Component Value Date    HDL 40.0 (A) 11/19/2011     Lab Results   Component Value Date    LDLCALC 125 (H) 11/19/2011       Cardiac Tests:  ECG: A resting electrocardiogram demonstrates evidence of sinus rhythm with occasional ventricular ectopy with low voltage and nonspecific ST changes      ASSESSMENT / PLAN: On a clinical basis, the patient appears compensated from a cardiovascular standpoint and at present I have not altered his existing medical regimen. I have encouraged ongoing appropriate lifestyle modification including continued abstinence from tobacco consumption along with that of additional weight reduction to benefit diastolic cardiac performance and reduce risk of the development of obstructive sleep apnea with associated adverse cardiovascular effects. Ongoing aggressive risk factor modification of blood pressure and serum lipids will remain essential to reducing risk of future atherosclerotic development. I presently plan clinical reevaluation in approximately 1 year and would happily reevaluate him in the interim should additional cardiovascular difficulties or concerns arise. The patient's current medication list, allergies, problem list and results of all previously ordered testing were reviewed at today's visit. Follow-up office visit in 1 year      Note: This report was completed using computerized voice recognition software. Every effort has been made to ensure accuracy, however; inadvertent computerized transcription errors may be present. Magui Ball.  Stacy Gallagher, [address] [facility]

## 2023-03-21 LAB
ALANINE AMINOTRANSFERASE (SGPT) (U/L) IN SER/PLAS: 13 U/L (ref 10–52)
ALBUMIN (G/DL) IN SER/PLAS: 4.2 G/DL (ref 3.4–5)
ALKALINE PHOSPHATASE (U/L) IN SER/PLAS: 95 U/L (ref 33–136)
ANION GAP IN SER/PLAS: 8 MMOL/L (ref 10–20)
ASPARTATE AMINOTRANSFERASE (SGOT) (U/L) IN SER/PLAS: 12 U/L (ref 9–39)
BASOPHILS (10*3/UL) IN BLOOD BY AUTOMATED COUNT: 0.07 X10E9/L (ref 0–0.1)
BASOPHILS/100 LEUKOCYTES IN BLOOD BY AUTOMATED COUNT: 0.5 % (ref 0–2)
BILIRUBIN TOTAL (MG/DL) IN SER/PLAS: 0.8 MG/DL (ref 0–1.2)
CALCIUM (MG/DL) IN SER/PLAS: 9.5 MG/DL (ref 8.6–10.3)
CARBON DIOXIDE, TOTAL (MMOL/L) IN SER/PLAS: 29 MMOL/L (ref 21–32)
CHLORIDE (MMOL/L) IN SER/PLAS: 103 MMOL/L (ref 98–107)
CHOLESTEROL (MG/DL) IN SER/PLAS: 93 MG/DL (ref 0–199)
CHOLESTEROL IN HDL (MG/DL) IN SER/PLAS: 30.8 MG/DL
CHOLESTEROL/HDL RATIO: 3
CREATININE (MG/DL) IN SER/PLAS: 1 MG/DL (ref 0.5–1.3)
EOSINOPHILS (10*3/UL) IN BLOOD BY AUTOMATED COUNT: 0.21 X10E9/L (ref 0–0.7)
EOSINOPHILS/100 LEUKOCYTES IN BLOOD BY AUTOMATED COUNT: 1.6 % (ref 0–6)
ERYTHROCYTE DISTRIBUTION WIDTH (RATIO) BY AUTOMATED COUNT: 13.4 % (ref 11.5–14.5)
ERYTHROCYTE MEAN CORPUSCULAR HEMOGLOBIN CONCENTRATION (G/DL) BY AUTOMATED: 34.6 G/DL (ref 32–36)
ERYTHROCYTE MEAN CORPUSCULAR VOLUME (FL) BY AUTOMATED COUNT: 94 FL (ref 80–100)
ERYTHROCYTES (10*6/UL) IN BLOOD BY AUTOMATED COUNT: 4.81 X10E12/L (ref 4.5–5.9)
GFR MALE: 85 ML/MIN/1.73M2
GLUCOSE (MG/DL) IN SER/PLAS: 87 MG/DL (ref 74–99)
HEMATOCRIT (%) IN BLOOD BY AUTOMATED COUNT: 45.4 % (ref 41–52)
HEMOGLOBIN (G/DL) IN BLOOD: 15.7 G/DL (ref 13.5–17.5)
IMMATURE GRANULOCYTES/100 LEUKOCYTES IN BLOOD BY AUTOMATED COUNT: 0.3 % (ref 0–0.9)
LDL: 30 MG/DL (ref 0–99)
LEUKOCYTES (10*3/UL) IN BLOOD BY AUTOMATED COUNT: 13.2 X10E9/L (ref 4.4–11.3)
LYMPHOCYTES (10*3/UL) IN BLOOD BY AUTOMATED COUNT: 2.97 X10E9/L (ref 1.2–4.8)
LYMPHOCYTES/100 LEUKOCYTES IN BLOOD BY AUTOMATED COUNT: 22.6 % (ref 13–44)
MONOCYTES (10*3/UL) IN BLOOD BY AUTOMATED COUNT: 0.73 X10E9/L (ref 0.1–1)
MONOCYTES/100 LEUKOCYTES IN BLOOD BY AUTOMATED COUNT: 5.5 % (ref 2–10)
NEUTROPHILS (10*3/UL) IN BLOOD BY AUTOMATED COUNT: 9.15 X10E9/L (ref 1.2–7.7)
NEUTROPHILS/100 LEUKOCYTES IN BLOOD BY AUTOMATED COUNT: 69.5 % (ref 40–80)
PLATELETS (10*3/UL) IN BLOOD AUTOMATED COUNT: 181 X10E9/L (ref 150–450)
POTASSIUM (MMOL/L) IN SER/PLAS: 4.4 MMOL/L (ref 3.5–5.3)
PROTEIN TOTAL: 7.2 G/DL (ref 6.4–8.2)
SODIUM (MMOL/L) IN SER/PLAS: 136 MMOL/L (ref 136–145)
TRIGLYCERIDE (MG/DL) IN SER/PLAS: 159 MG/DL (ref 0–149)
URATE (MG/DL) IN SER/PLAS: 3.6 MG/DL (ref 4–7.5)
UREA NITROGEN (MG/DL) IN SER/PLAS: 9 MG/DL (ref 6–23)
VLDL: 32 MG/DL (ref 0–40)

## 2023-03-22 LAB — PROSTATE SPECIFIC ANTIGEN,SCREEN: 0.27 NG/ML (ref 0–4)

## 2023-03-29 DIAGNOSIS — F51.01 PRIMARY INSOMNIA: Primary | ICD-10-CM

## 2023-03-29 DIAGNOSIS — I51.9 LV DYSFUNCTION: ICD-10-CM

## 2023-03-29 DIAGNOSIS — I25.10 CORONARY ARTERY DISEASE INVOLVING NATIVE CORONARY ARTERY OF NATIVE HEART WITHOUT ANGINA PECTORIS: ICD-10-CM

## 2023-03-29 RX ORDER — TEMAZEPAM 30 MG/1
1 CAPSULE ORAL NIGHTLY PRN
COMMUNITY
Start: 2021-10-19 | End: 2023-03-29 | Stop reason: SDUPTHER

## 2023-03-29 RX ORDER — TRAZODONE HYDROCHLORIDE 150 MG/1
150 TABLET ORAL NIGHTLY
Qty: 30 TABLET | Refills: 0 | Status: SHIPPED | OUTPATIENT
Start: 2023-03-29 | End: 2023-04-28 | Stop reason: SDUPTHER

## 2023-03-29 RX ORDER — TEMAZEPAM 30 MG/1
30 CAPSULE ORAL NIGHTLY PRN
Qty: 30 CAPSULE | Refills: 0 | Status: SHIPPED | OUTPATIENT
Start: 2023-03-29 | End: 2023-04-18

## 2023-03-29 RX ORDER — TRAZODONE HYDROCHLORIDE 150 MG/1
1 TABLET ORAL NIGHTLY
COMMUNITY
Start: 2022-10-04 | End: 2023-03-29 | Stop reason: SDUPTHER

## 2023-03-30 RX ORDER — METOPROLOL SUCCINATE 25 MG/1
TABLET, EXTENDED RELEASE ORAL
Qty: 90 TABLET | Refills: 3 | Status: SHIPPED | OUTPATIENT
Start: 2023-03-30

## 2023-03-30 RX ORDER — LISINOPRIL 10 MG/1
TABLET ORAL
Qty: 30 TABLET | Refills: 11 | Status: SHIPPED | OUTPATIENT
Start: 2023-03-30

## 2023-04-14 ENCOUNTER — TELEPHONE (OUTPATIENT)
Dept: PRIMARY CARE | Facility: CLINIC | Age: 63
End: 2023-04-14
Payer: COMMERCIAL

## 2023-04-14 DIAGNOSIS — G89.29 CHRONIC LOW BACK PAIN, UNSPECIFIED BACK PAIN LATERALITY, UNSPECIFIED WHETHER SCIATICA PRESENT: Primary | ICD-10-CM

## 2023-04-14 DIAGNOSIS — M54.50 CHRONIC LOW BACK PAIN, UNSPECIFIED BACK PAIN LATERALITY, UNSPECIFIED WHETHER SCIATICA PRESENT: Primary | ICD-10-CM

## 2023-04-18 DIAGNOSIS — I51.9 LV DYSFUNCTION: ICD-10-CM

## 2023-04-18 DIAGNOSIS — F51.01 PRIMARY INSOMNIA: ICD-10-CM

## 2023-04-18 DIAGNOSIS — I25.10 CORONARY ARTERY DISEASE INVOLVING NATIVE CORONARY ARTERY OF NATIVE HEART WITHOUT ANGINA PECTORIS: ICD-10-CM

## 2023-04-18 RX ORDER — TEMAZEPAM 30 MG/1
30 CAPSULE ORAL NIGHTLY PRN
Qty: 30 CAPSULE | Refills: 0 | Status: SHIPPED | OUTPATIENT
Start: 2023-04-18 | End: 2023-04-26 | Stop reason: SDUPTHER

## 2023-04-20 RX ORDER — ATORVASTATIN CALCIUM 40 MG/1
TABLET, FILM COATED ORAL
Qty: 30 TABLET | Refills: 11 | Status: SHIPPED | OUTPATIENT
Start: 2023-04-20

## 2023-04-26 DIAGNOSIS — F51.01 PRIMARY INSOMNIA: ICD-10-CM

## 2023-04-26 DIAGNOSIS — Z79.899 MEDICATION MANAGEMENT: Primary | ICD-10-CM

## 2023-04-26 RX ORDER — TEMAZEPAM 30 MG/1
30 CAPSULE ORAL NIGHTLY PRN
Qty: 30 CAPSULE | Refills: 0 | Status: SHIPPED | OUTPATIENT
Start: 2023-04-26 | End: 2023-05-26 | Stop reason: SDUPTHER

## 2023-04-28 DIAGNOSIS — F51.01 PRIMARY INSOMNIA: ICD-10-CM

## 2023-04-28 RX ORDER — TRAZODONE HYDROCHLORIDE 150 MG/1
150 TABLET ORAL NIGHTLY
Qty: 30 TABLET | Refills: 0 | Status: SHIPPED | OUTPATIENT
Start: 2023-04-28 | End: 2023-05-26 | Stop reason: SDUPTHER

## 2023-04-28 NOTE — TELEPHONE ENCOUNTER
Medication: Trazodone     -CSA: 3/15/23   -UDS: 7/19/21  -OARRS: 02/28/23  -appt: 2/28/23     Next appt date: 8/15/23

## 2023-05-26 DIAGNOSIS — F51.01 PRIMARY INSOMNIA: ICD-10-CM

## 2023-05-26 PROBLEM — E78.5 DYSLIPIDEMIA: Status: ACTIVE | Noted: 2023-05-26

## 2023-05-26 PROBLEM — G47.33 OSA (OBSTRUCTIVE SLEEP APNEA): Status: ACTIVE | Noted: 2023-05-26

## 2023-05-26 PROBLEM — G89.29 CHRONIC LOW BACK PAIN WITHOUT SCIATICA: Status: ACTIVE | Noted: 2023-05-26

## 2023-05-26 PROBLEM — I10 HTN (HYPERTENSION): Status: ACTIVE | Noted: 2023-05-26

## 2023-05-26 PROBLEM — N40.0 BPH (BENIGN PROSTATIC HYPERPLASIA): Status: ACTIVE | Noted: 2023-05-26

## 2023-05-26 PROBLEM — R20.2 NUMBNESS AND TINGLING IN LEFT ARM: Status: ACTIVE | Noted: 2023-05-26

## 2023-05-26 PROBLEM — M47.817 LUMBAR AND SACRAL SPONDYLARTHRITIS: Status: ACTIVE | Noted: 2023-05-26

## 2023-05-26 PROBLEM — R10.11 CHRONIC RIGHT UPPER QUADRANT PAIN: Status: ACTIVE | Noted: 2023-05-26

## 2023-05-26 PROBLEM — M47.812 CERVICAL SPONDYLOARTHRITIS: Status: ACTIVE | Noted: 2023-05-26

## 2023-05-26 PROBLEM — M25.50 MULTIPLE JOINT PAIN: Status: ACTIVE | Noted: 2023-05-26

## 2023-05-26 PROBLEM — M48.061 NEUROFORAMINAL STENOSIS OF LUMBAR SPINE: Status: ACTIVE | Noted: 2023-05-26

## 2023-05-26 PROBLEM — M10.9 GOUT: Status: ACTIVE | Noted: 2023-05-26

## 2023-05-26 PROBLEM — M25.561 PAIN IN RIGHT KNEE: Status: ACTIVE | Noted: 2023-05-26

## 2023-05-26 PROBLEM — G89.29 CHRONIC RIGHT UPPER QUADRANT PAIN: Status: ACTIVE | Noted: 2023-05-26

## 2023-05-26 PROBLEM — R93.89 ABNORMAL CT SCAN: Status: ACTIVE | Noted: 2023-05-26

## 2023-05-26 PROBLEM — G47.21 DELAYED SLEEP PHASE SYNDROME: Status: ACTIVE | Noted: 2023-05-26

## 2023-05-26 PROBLEM — M54.2 NECK PAIN: Status: ACTIVE | Noted: 2023-05-26

## 2023-05-26 PROBLEM — R91.8 LUNG NODULES: Status: ACTIVE | Noted: 2023-05-26

## 2023-05-26 PROBLEM — R20.0 NUMBNESS AND TINGLING IN LEFT ARM: Status: ACTIVE | Noted: 2023-05-26

## 2023-05-26 PROBLEM — N39.0 UTI (URINARY TRACT INFECTION): Status: ACTIVE | Noted: 2023-05-26

## 2023-05-26 PROBLEM — K04.7 TOOTH INFECTION: Status: ACTIVE | Noted: 2023-05-26

## 2023-05-26 PROBLEM — F51.04 CHRONIC INSOMNIA: Status: ACTIVE | Noted: 2023-05-26

## 2023-05-26 PROBLEM — F41.9 ANXIETY: Status: ACTIVE | Noted: 2023-05-26

## 2023-05-26 PROBLEM — M54.50 CHRONIC LOW BACK PAIN WITHOUT SCIATICA: Status: ACTIVE | Noted: 2023-05-26

## 2023-05-26 PROBLEM — R39.9 LOWER URINARY TRACT SYMPTOMS: Status: ACTIVE | Noted: 2023-05-26

## 2023-05-26 PROBLEM — H69.90 EUSTACHIAN TUBE DYSFUNCTION: Status: ACTIVE | Noted: 2023-05-26

## 2023-05-26 PROBLEM — F17.200 NICOTINE DEPENDENCE: Status: ACTIVE | Noted: 2023-05-26

## 2023-05-26 PROBLEM — T30.0 BURN: Status: ACTIVE | Noted: 2023-05-26

## 2023-05-26 RX ORDER — ASPIRIN 81 MG/1
1 TABLET ORAL DAILY
COMMUNITY
Start: 2020-10-28

## 2023-05-26 RX ORDER — VALACYCLOVIR HYDROCHLORIDE 1 G/1
TABLET, FILM COATED ORAL
COMMUNITY
Start: 2022-08-11 | End: 2024-02-12 | Stop reason: ALTCHOICE

## 2023-05-26 RX ORDER — ALLOPURINOL 300 MG/1
300 TABLET ORAL DAILY
COMMUNITY
End: 2023-07-17

## 2023-05-26 RX ORDER — TEMAZEPAM 30 MG/1
30 CAPSULE ORAL NIGHTLY PRN
Qty: 30 CAPSULE | Refills: 0 | Status: SHIPPED | OUTPATIENT
Start: 2023-05-26 | End: 2023-06-27 | Stop reason: SDUPTHER

## 2023-05-26 RX ORDER — NAPROXEN 500 MG/1
TABLET ORAL EVERY 12 HOURS
COMMUNITY
Start: 2023-05-24 | End: 2023-10-18 | Stop reason: ALTCHOICE

## 2023-05-26 RX ORDER — FINASTERIDE 5 MG/1
1 TABLET, FILM COATED ORAL DAILY
COMMUNITY
Start: 2020-10-28

## 2023-05-26 RX ORDER — GABAPENTIN 300 MG/1
CAPSULE ORAL
COMMUNITY
End: 2023-07-17 | Stop reason: SDUPTHER

## 2023-05-26 RX ORDER — ATORVASTATIN CALCIUM 40 MG/1
40 TABLET, FILM COATED ORAL DAILY
COMMUNITY

## 2023-05-26 RX ORDER — TRAZODONE HYDROCHLORIDE 150 MG/1
150 TABLET ORAL NIGHTLY
Qty: 30 TABLET | Refills: 0 | Status: SHIPPED | OUTPATIENT
Start: 2023-05-26 | End: 2023-06-27 | Stop reason: SDUPTHER

## 2023-05-26 RX ORDER — CYCLOBENZAPRINE HCL 10 MG
10 TABLET ORAL 3 TIMES DAILY PRN
COMMUNITY
End: 2023-07-17 | Stop reason: SDUPTHER

## 2023-05-26 RX ORDER — DICYCLOMINE HYDROCHLORIDE 10 MG/1
CAPSULE ORAL
COMMUNITY
Start: 2023-01-18 | End: 2023-10-18 | Stop reason: ALTCHOICE

## 2023-05-26 RX ORDER — LISINOPRIL 10 MG/1
TABLET ORAL
COMMUNITY
Start: 2022-10-23 | End: 2023-10-18 | Stop reason: ALTCHOICE

## 2023-05-26 RX ORDER — OMEPRAZOLE 40 MG/1
40 CAPSULE, DELAYED RELEASE ORAL DAILY
COMMUNITY
Start: 2023-05-05

## 2023-05-26 RX ORDER — METOPROLOL SUCCINATE 25 MG/1
TABLET, EXTENDED RELEASE ORAL
COMMUNITY
Start: 2023-05-26

## 2023-05-26 RX ORDER — TAMSULOSIN HYDROCHLORIDE 0.4 MG/1
0.4 CAPSULE ORAL DAILY
COMMUNITY
Start: 2020-10-28

## 2023-06-27 DIAGNOSIS — F51.01 PRIMARY INSOMNIA: ICD-10-CM

## 2023-06-27 RX ORDER — TRAZODONE HYDROCHLORIDE 150 MG/1
150 TABLET ORAL NIGHTLY
Qty: 30 TABLET | Refills: 0 | Status: SHIPPED | OUTPATIENT
Start: 2023-06-27 | End: 2023-07-26 | Stop reason: SDUPTHER

## 2023-06-27 RX ORDER — TEMAZEPAM 30 MG/1
30 CAPSULE ORAL NIGHTLY PRN
Qty: 30 CAPSULE | Refills: 0 | Status: SHIPPED | OUTPATIENT
Start: 2023-06-27 | End: 2023-07-26 | Stop reason: SDUPTHER

## 2023-07-14 DIAGNOSIS — M10.9 GOUT, UNSPECIFIED: ICD-10-CM

## 2023-07-17 DIAGNOSIS — M54.50 CHRONIC LOW BACK PAIN WITHOUT SCIATICA, UNSPECIFIED BACK PAIN LATERALITY: ICD-10-CM

## 2023-07-17 DIAGNOSIS — R20.0 NUMBNESS AND TINGLING IN LEFT ARM: ICD-10-CM

## 2023-07-17 DIAGNOSIS — R20.2 NUMBNESS AND TINGLING IN LEFT ARM: ICD-10-CM

## 2023-07-17 DIAGNOSIS — G89.29 CHRONIC LOW BACK PAIN WITHOUT SCIATICA, UNSPECIFIED BACK PAIN LATERALITY: ICD-10-CM

## 2023-07-17 RX ORDER — CYCLOBENZAPRINE HCL 10 MG
10 TABLET ORAL 3 TIMES DAILY PRN
Qty: 90 TABLET | Refills: 0 | Status: SHIPPED | OUTPATIENT
Start: 2023-07-17 | End: 2023-08-15 | Stop reason: SDUPTHER

## 2023-07-17 RX ORDER — GABAPENTIN 300 MG/1
600 CAPSULE ORAL 3 TIMES DAILY
Qty: 180 CAPSULE | Refills: 0 | Status: SHIPPED | OUTPATIENT
Start: 2023-07-17 | End: 2023-08-15 | Stop reason: SDUPTHER

## 2023-07-17 RX ORDER — ALLOPURINOL 300 MG/1
TABLET ORAL
Qty: 90 TABLET | Refills: 3 | Status: SHIPPED | OUTPATIENT
Start: 2023-07-17 | End: 2023-10-18 | Stop reason: SDUPTHER

## 2023-07-17 NOTE — TELEPHONE ENCOUNTER
Pt usually gets from pain management but the provider moved out of town and he has not established with a new one yet. I gave him Tina's number and advised that you may send refills once but that he needs to follow up with pain management.

## 2023-07-25 ENCOUNTER — TELEPHONE (OUTPATIENT)
Dept: NEUROSURGERY | Age: 63
End: 2023-07-25

## 2023-07-25 NOTE — TELEPHONE ENCOUNTER
Request received from [address] requesting Medical Records on behalf of patient sent request to [employer]; documentation faxed and scanned to chart with fax conf.

## 2023-07-26 DIAGNOSIS — F51.01 PRIMARY INSOMNIA: ICD-10-CM

## 2023-07-26 RX ORDER — TEMAZEPAM 30 MG/1
30 CAPSULE ORAL NIGHTLY PRN
Qty: 30 CAPSULE | Refills: 0 | Status: SHIPPED | OUTPATIENT
Start: 2023-07-26 | End: 2023-08-23 | Stop reason: SDUPTHER

## 2023-07-26 RX ORDER — TRAZODONE HYDROCHLORIDE 150 MG/1
150 TABLET ORAL NIGHTLY
Qty: 30 TABLET | Refills: 0 | Status: SHIPPED | OUTPATIENT
Start: 2023-07-26 | End: 2023-08-23 | Stop reason: SDUPTHER

## 2023-08-07 NOTE — PROGRESS NOTES
Saji Gordon is a 62 y.o. male who presents for Follow-up (6 months; rash on right arm, popped up yesterday, spot was black; trying to get in with pain management in [place] for gabapentin, can we refill?/).    Skin lesion: Started yesterday. Black with erythematous surrounding.     RUQ pain: Colicky RUQ pain for the last 4 or 5 years.      HTN, CAD: He is established with cardiology at [facility]. He has had cath which showed CAD and growth of new vessels to supply cardiac muscle. No dizziness in the last month. On lisinopril and metoprolol. He is currently dealing with feeling cold all the time. He is also dealing with dizziness that comes on at random. He is dealing with near-syncope.     BPH, urinary retention: On finasteride and flomax. Previously did well on brand name cialis which insurance will not pay for. He is seeing Dr. Castro Phan. He has been in discussion about holo precedure and urolift also but at this point he is not planning for any procedures.      Gout: On allopurinol, no SE's.     Insomnia: He has been on many meds in the past. Currently on temazepam which helps but not every night and he wakes up for different issues. He is having racing thoughts some nights, pain that keeps him awake at night. He used to see a pain management doctor who gave him all his meds except cardiac medications. His partner is seeing pain mgmt in [place], he may establish with them. He has an appointment with Dr. Burris but he has to get a sleep study before he sees her. He can't go to sleep at 730 which is [facility] standard report time. He is going to [facility] for his sleep study since they will let him come in at any point.      Neuropathy: On gabapentin, today he states that it doesn't help much with pain but when he doesn't take it his sleep issues get way worse. He is trying to get in with a new pain mgmt in [facility].    He is having more issues with pain, more frequently. He has spinal stenosis in cervical  "spine and lumbar spine. Considering pain mgmt in [place], who his partner sees. He was on pain medication for 20 years and complaint with prescribing rules. His specialists son took over the practice and wouldn't prescribe pain medication.      GERD, hiatal hernia: On omeprazole, no SE's.     All other systems have been reviewed and are negative for complaint     Objective   Ht 1.88 m (6' 2\")   Wt 99.8 kg (220 lb)   BMI 28.25 kg/m²     Gen: No acute distress. Alert and oriented x3.   HEENT: Normocephalic, atraumatic. PERRLA and EOMI, no conjunctival injection. B/L EAC are clear, TM's viewed are WNL. No rhinorrhea, no oropharyngeal lesions.  Neck: No lymphadenopathy, thyroid WNL.  CV: Regular rate and rhythm. Normal S1/S2.  Resp: CTAB/L. No wheezes or rhonchi appreciated.  Abdomen: Soft. Nontender. Nondistended. Bowel sounds normoactive. No guarding or rigidity.  Derm: Skin is warm and dry. No rashes appreciated or suspicious lesions noted.   Neuro: Cranial nerves intact. Normal gait.  Psych: Appropriate mood and affect. Normal speech and eye contact.   Extremities: No deformities appreciated. No severe edema.    Assessment/Plan      #Suspicious nevus  Trial keflex  If no improvement will have him see derm     #HTN:  #CAD  following with Dr. Olsen, unsure that he will followup again with him at all  on metoprolol and lisinopril  Chronically hypotensive without close followup from cardio  Lowering the dose of lisinopril today     #Insomnia  patient states that he finds the sleep study inaccurate because he sleeps on his stomach which the test doesn't allow for  wants to consider trying quviviq? I will set him up with sleep medicine specialist  CSA signed Feb 2023  UDS pending  Doing ok on temazepam and trazodone for now     #Gout  on allopurinol     #Enlarged prostate:  on flomax and finasteride  did well on brand name cialis but not covered  following with urology, prostate procedure is on hold (HOLEP, holmium " procedure of the prostate)     #GERD  #Hiatal hernia:  Controlled on omeprazole     HCM:  Last C-scope was in June 2021, next due 2032  UTD for COVID vaccines  Flu and shingles vaccines today  Monitor PSA

## 2023-08-14 PROBLEM — R35.0 FREQUENCY OF URINATION: Status: RESOLVED | Noted: 2023-08-14 | Resolved: 2023-08-14

## 2023-08-14 PROBLEM — R35.1 NOCTURIA: Status: RESOLVED | Noted: 2023-08-14 | Resolved: 2023-08-14

## 2023-08-14 PROBLEM — R33.9 RETENTION OF URINE: Status: RESOLVED | Noted: 2023-08-14 | Resolved: 2023-08-14

## 2023-08-14 RX ORDER — TADALAFIL 5 MG/1
TABLET ORAL EVERY 24 HOURS
COMMUNITY
End: 2023-10-18 | Stop reason: ALTCHOICE

## 2023-08-15 ENCOUNTER — LAB (OUTPATIENT)
Dept: LAB | Facility: LAB | Age: 63
End: 2023-08-15
Payer: COMMERCIAL

## 2023-08-15 ENCOUNTER — OFFICE VISIT (OUTPATIENT)
Dept: PRIMARY CARE | Facility: CLINIC | Age: 63
End: 2023-08-15
Payer: COMMERCIAL

## 2023-08-15 VITALS
HEIGHT: 74 IN | SYSTOLIC BLOOD PRESSURE: 90 MMHG | DIASTOLIC BLOOD PRESSURE: 60 MMHG | BODY MASS INDEX: 28.23 KG/M2 | WEIGHT: 220 LBS

## 2023-08-15 DIAGNOSIS — R20.0 NUMBNESS AND TINGLING IN LEFT ARM: ICD-10-CM

## 2023-08-15 DIAGNOSIS — L03.90 CELLULITIS, UNSPECIFIED CELLULITIS SITE: Primary | ICD-10-CM

## 2023-08-15 DIAGNOSIS — G89.29 CHRONIC LOW BACK PAIN WITHOUT SCIATICA, UNSPECIFIED BACK PAIN LATERALITY: ICD-10-CM

## 2023-08-15 DIAGNOSIS — M54.50 CHRONIC LOW BACK PAIN WITHOUT SCIATICA, UNSPECIFIED BACK PAIN LATERALITY: ICD-10-CM

## 2023-08-15 DIAGNOSIS — I10 PRIMARY HYPERTENSION: ICD-10-CM

## 2023-08-15 DIAGNOSIS — R20.2 NUMBNESS AND TINGLING IN LEFT ARM: ICD-10-CM

## 2023-08-15 DIAGNOSIS — R10.11 RIGHT UPPER QUADRANT PAIN: ICD-10-CM

## 2023-08-15 DIAGNOSIS — R68.89 COLD INTOLERANCE: ICD-10-CM

## 2023-08-15 DIAGNOSIS — Z79.899 MEDICATION MANAGEMENT: ICD-10-CM

## 2023-08-15 LAB
AMPHETAMINE (PRESENCE) IN URINE BY SCREEN METHOD: ABNORMAL
BARBITURATES PRESENCE IN URINE BY SCREEN METHOD: ABNORMAL
BENZODIAZEPINE (PRESENCE) IN URINE BY SCREEN METHOD: ABNORMAL
CANNABINOIDS IN URINE BY SCREEN METHOD: ABNORMAL
COCAINE (PRESENCE) IN URINE BY SCREEN METHOD: ABNORMAL
DRUG SCREEN COMMENT URINE: ABNORMAL
FENTANYL URINE: ABNORMAL
METHADONE (PRESENCE) IN URINE BY SCREEN METHOD: ABNORMAL
OPIATES (PRESENCE) IN URINE BY SCREEN METHOD: ABNORMAL
OXYCODONE (PRESENCE) IN URINE BY SCREEN METHOD: ABNORMAL
PHENCYCLIDINE (PRESENCE) IN URINE BY SCREEN METHOD: ABNORMAL

## 2023-08-15 PROCEDURE — 90472 IMMUNIZATION ADMIN EACH ADD: CPT | Performed by: FAMILY MEDICINE

## 2023-08-15 PROCEDURE — 3074F SYST BP LT 130 MM HG: CPT | Performed by: FAMILY MEDICINE

## 2023-08-15 PROCEDURE — 90471 IMMUNIZATION ADMIN: CPT | Performed by: FAMILY MEDICINE

## 2023-08-15 PROCEDURE — 99214 OFFICE O/P EST MOD 30 MIN: CPT | Performed by: FAMILY MEDICINE

## 2023-08-15 PROCEDURE — 1036F TOBACCO NON-USER: CPT | Performed by: FAMILY MEDICINE

## 2023-08-15 PROCEDURE — 90686 IIV4 VACC NO PRSV 0.5 ML IM: CPT | Performed by: FAMILY MEDICINE

## 2023-08-15 PROCEDURE — 3078F DIAST BP <80 MM HG: CPT | Performed by: FAMILY MEDICINE

## 2023-08-15 PROCEDURE — 90750 HZV VACC RECOMBINANT IM: CPT | Performed by: FAMILY MEDICINE

## 2023-08-15 PROCEDURE — 80346 BENZODIAZEPINES1-12: CPT

## 2023-08-15 PROCEDURE — 80307 DRUG TEST PRSMV CHEM ANLYZR: CPT

## 2023-08-15 RX ORDER — CYCLOBENZAPRINE HCL 10 MG
10 TABLET ORAL 3 TIMES DAILY PRN
Qty: 90 TABLET | Refills: 0 | Status: SHIPPED | OUTPATIENT
Start: 2023-08-15 | End: 2023-11-02

## 2023-08-15 RX ORDER — LISINOPRIL 5 MG/1
5 TABLET ORAL DAILY
Qty: 30 TABLET | Refills: 11 | Status: SHIPPED | OUTPATIENT
Start: 2023-08-15 | End: 2024-08-14

## 2023-08-15 RX ORDER — CEPHALEXIN 500 MG/1
500 CAPSULE ORAL 2 TIMES DAILY
Qty: 20 CAPSULE | Refills: 0 | Status: SHIPPED | OUTPATIENT
Start: 2023-08-15 | End: 2023-08-25

## 2023-08-15 RX ORDER — GABAPENTIN 300 MG/1
600 CAPSULE ORAL 3 TIMES DAILY
Qty: 180 CAPSULE | Refills: 0 | Status: SHIPPED | OUTPATIENT
Start: 2023-08-15 | End: 2023-10-18 | Stop reason: ALTCHOICE

## 2023-08-15 NOTE — PATIENT INSTRUCTIONS
Pain Management:   [facility] Val[facility]uel[facility] () [phone]    Dermatology:  [place] [phone]  Tenzin Monroe ([facility]) [phone]

## 2023-08-21 LAB
7-AMINOCLONAZEPAM: <25 NG/ML
ALPHA-HYDROXYALPRAZOLAM: <25 NG/ML
ALPHA-HYDROXYMIDAZOLAM: <25 NG/ML
ALPRAZOLAM: <25 NG/ML
CHLORDIAZEPOXIDE: <25 NG/ML
CLONAZEPAM: <25 NG/ML
DIAZEPAM: <25 NG/ML
LORAZEPAM: <25 NG/ML
MIDAZOLAM: <25 NG/ML
NORDIAZEPAM: <25 NG/ML
OXAZEPAM: 942 NG/ML
TEMAZEPAM: >1000 NG/ML

## 2023-08-23 DIAGNOSIS — F51.01 PRIMARY INSOMNIA: ICD-10-CM

## 2023-08-23 RX ORDER — TRAZODONE HYDROCHLORIDE 150 MG/1
150 TABLET ORAL NIGHTLY
Qty: 30 TABLET | Refills: 0 | Status: SHIPPED | OUTPATIENT
Start: 2023-08-23 | End: 2023-09-20 | Stop reason: SDUPTHER

## 2023-08-23 RX ORDER — TEMAZEPAM 30 MG/1
30 CAPSULE ORAL NIGHTLY PRN
Qty: 30 CAPSULE | Refills: 0 | Status: SHIPPED | OUTPATIENT
Start: 2023-08-23 | End: 2023-09-20 | Stop reason: SDUPTHER

## 2023-08-23 NOTE — TELEPHONE ENCOUNTER
Medication: Tenazepam 30mg  -CSA: 03/15/23   -UDS: 08/15/23  -Last appt: 08/15/23  -Next appt date: 02/13/24

## 2023-08-28 ENCOUNTER — TELEPHONE (OUTPATIENT)
Dept: PRIMARY CARE | Facility: CLINIC | Age: 63
End: 2023-08-28
Payer: COMMERCIAL

## 2023-08-28 DIAGNOSIS — K80.20 GALL STONES: Primary | ICD-10-CM

## 2023-08-28 NOTE — TELEPHONE ENCOUNTER
Pt states he at MRI and US done and has gallstones and horrible pain. Can not take the pain much longer, what is his next steps. Should he make an appointment with you or call [facility]. I gave him a card for Dr Segal.

## 2023-09-01 ENCOUNTER — INITIAL CONSULT (OUTPATIENT)
Dept: SURGERY | Age: 63
End: 2023-09-01

## 2023-09-01 ENCOUNTER — TELEPHONE (OUTPATIENT)
Dept: SURGERY | Age: 63
End: 2023-09-01

## 2023-09-01 VITALS
BODY MASS INDEX: 30.16 KG/M2 | WEIGHT: 235 LBS | DIASTOLIC BLOOD PRESSURE: 77 MMHG | HEART RATE: 80 BPM | HEIGHT: 74 IN | SYSTOLIC BLOOD PRESSURE: 117 MMHG | TEMPERATURE: 97.2 F

## 2023-09-01 DIAGNOSIS — K80.12 CALCULUS OF GALLBLADDER WITH ACUTE ON CHRONIC CHOLECYSTITIS WITHOUT OBSTRUCTION: Primary | ICD-10-CM

## 2023-09-01 PROCEDURE — 3017F COLORECTAL CA SCREEN DOC REV: CPT | Performed by: SURGERY

## 2023-09-01 PROCEDURE — 1036F TOBACCO NON-USER: CPT | Performed by: SURGERY

## 2023-09-01 PROCEDURE — G8427 DOCREV CUR MEDS BY ELIG CLIN: HCPCS | Performed by: SURGERY

## 2023-09-01 PROCEDURE — G8417 CALC BMI ABV UP PARAM F/U: HCPCS | Performed by: SURGERY

## 2023-09-01 RX ORDER — SODIUM CHLORIDE, SODIUM LACTATE, POTASSIUM CHLORIDE, CALCIUM CHLORIDE 600; 310; 30; 20 MG/100ML; MG/100ML; MG/100ML; MG/100ML
INJECTION, SOLUTION INTRAVENOUS CONTINUOUS
OUTPATIENT
Start: 2023-09-01

## 2023-09-01 RX ORDER — SODIUM CHLORIDE 0.9 % (FLUSH) 0.9 %
5-40 SYRINGE (ML) INJECTION PRN
OUTPATIENT
Start: 2023-09-01

## 2023-09-01 RX ORDER — SODIUM CHLORIDE 9 MG/ML
INJECTION, SOLUTION INTRAVENOUS PRN
OUTPATIENT
Start: 2023-09-01

## 2023-09-01 RX ORDER — KETOROLAC TROMETHAMINE 30 MG/ML
30 INJECTION, SOLUTION INTRAMUSCULAR; INTRAVENOUS ONCE
OUTPATIENT
Start: 2023-09-01 | End: 2023-09-01

## 2023-09-01 RX ORDER — ACETAMINOPHEN 325 MG/1
1000 TABLET ORAL ONCE
OUTPATIENT
Start: 2023-09-01 | End: 2023-09-01

## 2023-09-01 RX ORDER — SODIUM CHLORIDE 0.9 % (FLUSH) 0.9 %
5-40 SYRINGE (ML) INJECTION EVERY 12 HOURS SCHEDULED
OUTPATIENT
Start: 2023-09-01

## 2023-09-01 RX ORDER — TIZANIDINE 4 MG/1
4 TABLET ORAL DAILY
COMMUNITY

## 2023-09-01 RX ORDER — DULOXETIN HYDROCHLORIDE 30 MG/1
30 CAPSULE, DELAYED RELEASE ORAL DAILY
COMMUNITY

## 2023-09-01 NOTE — PROGRESS NOTES
[facility]  9/1/2023  Terence Ramirezr              History and Physical  Laparoscopic Cholecystectomy [id number]    CHIEF COMPLAINT: RUQ pain, Cholelithiasis. HISTORY OF PRESENT Med Diana is a 58 y.o. male with RUQ pain for 2 years, CT scan shows stones. We discussed medical options and the risk of not removing the gallbladder and he has decided to proceed with laparoscopic/robotic cholecystectomy, possible open. Past Medical History:   Diagnosis Date    Arthritis     CAD (coronary artery disease)     Gout     Hiatal hernia     Hyperlipidemia     Hypertension     Spinal stenosis      Past Surgical History:   Procedure Laterality Date    CARDIAC CATHETERIZATION  07/05/2017    [facility]-Chronic total occlusion of RCA with collaterals. Hypokinesis of inferior wall with overall mildly reduced left ventricular systolic function. CATARACT REMOVAL WITH IMPLANT Left 8 19 14    CATARACT REMOVAL WITH IMPLANT Right 12 1 15    PILONIDAL CYST EXCISION  age 12    [address] [facility] Medications  Prior to Visit Medications    Medication Sig Taking? Authorizing Provider   DULoxetine (CYMBALTA) 30 MG extended release capsule Take 1 capsule by mouth daily Yes Historical Provider, MD   tiZANidine (ZANAFLEX) 4 MG tablet Take 1 tablet by mouth daily Yes Historical Provider, MD   atorvastatin (LIPITOR) 40 MG tablet TAKE 1 TABLET BY MOUTH EVERY DAY Yes Gui Frye MD   lisinopril (PRINIVIL;ZESTRIL) 10 MG tablet TAKE 1 TABLET BY MOUTH EVERY DAY Yes Gui Frye MD   metoprolol succinate (TOPROL XL) 25 MG extended release tablet TAKE 1 TABLET BY MOUTH EVERY DAY Yes Gui Frye MD   omeprazole (PRILOSEC) 40 MG delayed release capsule take 1 (ONE) cap(s) orally once a day 30 day(s) Yes Historical Provider, MD   gabapentin (NEURONTIN) 300 MG capsule Take 1 capsule by mouth 3 times daily.  Yes Historical Provider, MD   temazepam (RESTORIL) 30 MG capsule Take 1

## 2023-09-01 NOTE — TELEPHONE ENCOUNTER
Patient will need to obtain cardiac clearance to proceed with lap estefania. Request for cardiac clearance given to patient. Patient will take to cardiology and schedule appt. Patient will call once he is cleared for surgery.   Electronically signed by Danielle Simpson on 9/1/23 at 12:11 PM EDT

## 2023-09-20 DIAGNOSIS — F51.01 PRIMARY INSOMNIA: ICD-10-CM

## 2023-09-20 RX ORDER — TRAZODONE HYDROCHLORIDE 150 MG/1
150 TABLET ORAL NIGHTLY
Qty: 30 TABLET | Refills: 0 | Status: SHIPPED | OUTPATIENT
Start: 2023-09-20 | End: 2023-10-18 | Stop reason: SDUPTHER

## 2023-09-20 RX ORDER — TEMAZEPAM 30 MG/1
30 CAPSULE ORAL NIGHTLY PRN
Qty: 30 CAPSULE | Refills: 0 | Status: SHIPPED | OUTPATIENT
Start: 2023-09-20 | End: 2023-10-18

## 2023-10-01 PROBLEM — E66.9 CLASS 1 OBESITY WITH BODY MASS INDEX (BMI) OF 30.0 TO 30.9 IN ADULT: Status: ACTIVE | Noted: 2023-10-01

## 2023-10-01 PROBLEM — L82.1 OTHER SEBORRHEIC KERATOSIS: Status: ACTIVE | Noted: 2021-11-15

## 2023-10-01 PROBLEM — D18.01 HEMANGIOMA OF SKIN AND SUBCUTANEOUS TISSUE: Status: ACTIVE | Noted: 2021-11-15

## 2023-10-01 PROBLEM — E66.811 CLASS 1 OBESITY WITH BODY MASS INDEX (BMI) OF 31.0 TO 31.9 IN ADULT: Status: ACTIVE | Noted: 2023-10-01

## 2023-10-01 PROBLEM — F17.200 NICOTINE ADDICTION: Status: ACTIVE | Noted: 2023-10-01

## 2023-10-01 PROBLEM — L81.4 OTHER MELANIN HYPERPIGMENTATION: Status: ACTIVE | Noted: 2021-11-15

## 2023-10-01 PROBLEM — M54.16 LUMBAR RADICULOPATHY: Status: ACTIVE | Noted: 2023-10-01

## 2023-10-01 PROBLEM — K80.20 GALLSTONES: Status: ACTIVE | Noted: 2023-10-01

## 2023-10-01 PROBLEM — D48.5 NEOPLASM OF UNCERTAIN BEHAVIOR OF SKIN: Status: ACTIVE | Noted: 2021-11-15

## 2023-10-01 PROBLEM — F17.200 CURRENT SMOKER: Status: ACTIVE | Noted: 2020-09-13

## 2023-10-01 PROBLEM — E66.9 CLASS 1 OBESITY WITH BODY MASS INDEX (BMI) OF 32.0 TO 32.9 IN ADULT: Status: ACTIVE | Noted: 2023-10-01

## 2023-10-01 PROBLEM — E66.811 CLASS 1 OBESITY DUE TO EXCESS CALORIES WITH SERIOUS COMORBIDITY AND BODY MASS INDEX (BMI) OF 33.0 TO 33.9 IN ADULT: Status: ACTIVE | Noted: 2023-10-01

## 2023-10-01 PROBLEM — B00.9 HERPESVIRAL INFECTION, UNSPECIFIED: Status: ACTIVE | Noted: 2021-11-15

## 2023-10-01 PROBLEM — E66.811 CLASS 1 OBESITY WITH BODY MASS INDEX (BMI) OF 32.0 TO 32.9 IN ADULT: Status: ACTIVE | Noted: 2023-10-01

## 2023-10-01 PROBLEM — R10.9 ABDOMINAL PAIN: Status: ACTIVE | Noted: 2023-10-01

## 2023-10-01 PROBLEM — L72.3 SEBACEOUS CYST: Status: ACTIVE | Noted: 2021-11-15

## 2023-10-01 PROBLEM — E66.9 CLASS 1 OBESITY WITH BODY MASS INDEX (BMI) OF 31.0 TO 31.9 IN ADULT: Status: ACTIVE | Noted: 2023-10-01

## 2023-10-01 PROBLEM — E66.09 CLASS 1 OBESITY DUE TO EXCESS CALORIES WITH SERIOUS COMORBIDITY AND BODY MASS INDEX (BMI) OF 33.0 TO 33.9 IN ADULT: Status: ACTIVE | Noted: 2023-10-01

## 2023-10-01 PROBLEM — M48.062 SPINAL STENOSIS OF LUMBAR REGION WITH NEUROGENIC CLAUDICATION: Status: ACTIVE | Noted: 2019-04-19

## 2023-10-01 PROBLEM — M25.561 BILATERAL KNEE PAIN: Status: ACTIVE | Noted: 2023-10-01

## 2023-10-01 PROBLEM — M25.562 BILATERAL KNEE PAIN: Status: ACTIVE | Noted: 2023-10-01

## 2023-10-01 PROBLEM — M54.12 CERVICAL RADICULOPATHY: Status: ACTIVE | Noted: 2023-10-01

## 2023-10-01 PROBLEM — L91.8 OTHER HYPERTROPHIC DISORDERS OF THE SKIN: Status: ACTIVE | Noted: 2021-11-15

## 2023-10-01 PROBLEM — F17.218 NICOTINE DEPENDENCE, CIGARETTES, WITH OTHER NICOTINE-INDUCED DISORDERS: Status: ACTIVE | Noted: 2023-10-01

## 2023-10-01 PROBLEM — E66.811 CLASS 1 OBESITY WITH BODY MASS INDEX (BMI) OF 30.0 TO 30.9 IN ADULT: Status: ACTIVE | Noted: 2023-10-01

## 2023-10-01 RX ORDER — TIZANIDINE 4 MG/1
TABLET ORAL
COMMUNITY
Start: 2023-08-30 | End: 2023-10-18 | Stop reason: ALTCHOICE

## 2023-10-01 RX ORDER — DULOXETIN HYDROCHLORIDE 30 MG/1
30 CAPSULE, DELAYED RELEASE ORAL DAILY
COMMUNITY
Start: 2023-08-30 | End: 2023-10-18 | Stop reason: ALTCHOICE

## 2023-10-03 ENCOUNTER — OFFICE VISIT (OUTPATIENT)
Dept: PAIN MEDICINE | Facility: HOSPITAL | Age: 63
End: 2023-10-03
Payer: COMMERCIAL

## 2023-10-03 VITALS
HEIGHT: 74 IN | OXYGEN SATURATION: 98 % | HEART RATE: 72 BPM | SYSTOLIC BLOOD PRESSURE: 118 MMHG | TEMPERATURE: 97.1 F | WEIGHT: 225 LBS | DIASTOLIC BLOOD PRESSURE: 77 MMHG | RESPIRATION RATE: 17 BRPM | BODY MASS INDEX: 28.88 KG/M2

## 2023-10-03 DIAGNOSIS — M54.16 LUMBAR RADICULOPATHY: ICD-10-CM

## 2023-10-03 DIAGNOSIS — G89.29 CHRONIC BACK PAIN GREATER THAN 3 MONTHS DURATION: ICD-10-CM

## 2023-10-03 DIAGNOSIS — M47.814 FACET ARTHROPATHY, THORACIC: ICD-10-CM

## 2023-10-03 DIAGNOSIS — Z79.899 MEDICATION MANAGEMENT: ICD-10-CM

## 2023-10-03 DIAGNOSIS — M54.9 CHRONIC BACK PAIN GREATER THAN 3 MONTHS DURATION: ICD-10-CM

## 2023-10-03 DIAGNOSIS — M47.817 FACET ARTHROPATHY, LUMBOSACRAL: Primary | ICD-10-CM

## 2023-10-03 DIAGNOSIS — M77.9 BONE SPUR: Primary | ICD-10-CM

## 2023-10-03 LAB
AMPHETAMINES UR QL SCN: ABNORMAL
BARBITURATES UR QL SCN: ABNORMAL
BENZODIAZ UR QL SCN: ABNORMAL
BZE UR QL SCN: ABNORMAL
CANNABINOIDS UR QL SCN: ABNORMAL
FENTANYL+NORFENTANYL UR QL SCN: ABNORMAL
OPIATES UR QL SCN: ABNORMAL
OXYCODONE+OXYMORPHONE UR QL SCN: ABNORMAL
PCP UR QL SCN: ABNORMAL

## 2023-10-03 PROCEDURE — 80307 DRUG TEST PRSMV CHEM ANLYZR: CPT | Mod: CONLAB | Performed by: NURSE PRACTITIONER

## 2023-10-03 PROCEDURE — 99213 OFFICE O/P EST LOW 20 MIN: CPT | Performed by: NURSE PRACTITIONER

## 2023-10-03 PROCEDURE — 80346 BENZODIAZEPINES1-12: CPT | Mod: CMCLAB,CONLAB | Performed by: NURSE PRACTITIONER

## 2023-10-03 RX ORDER — INDOMETHACIN 25 MG/1
25 CAPSULE ORAL 3 TIMES DAILY PRN
Qty: 30 CAPSULE | Refills: 5 | Status: SHIPPED | OUTPATIENT
Start: 2023-10-03 | End: 2024-10-02

## 2023-10-03 ASSESSMENT — ENCOUNTER SYMPTOMS
EYES NEGATIVE: 1
NEUROLOGICAL NEGATIVE: 1
RESPIRATORY NEGATIVE: 1
PSYCHIATRIC NEGATIVE: 1
BACK PAIN: 1
CONSTITUTIONAL NEGATIVE: 1
MYALGIAS: 1
CARDIOVASCULAR NEGATIVE: 1
ENDOCRINE NEGATIVE: 1
GASTROINTESTINAL NEGATIVE: 1
ALLERGIC/IMMUNOLOGIC NEGATIVE: 1

## 2023-10-03 ASSESSMENT — PAIN SCALES - GENERAL
PAINLEVEL_OUTOF10: 8
PAINLEVEL: 8

## 2023-10-03 ASSESSMENT — LIFESTYLE VARIABLES: TOTAL SCORE: 0

## 2023-10-03 ASSESSMENT — PAIN - FUNCTIONAL ASSESSMENT: PAIN_FUNCTIONAL_ASSESSMENT: 0-10

## 2023-10-03 NOTE — PROGRESS NOTES
I have personally reviewed the OARRS report for Saji Gordon  I have considered the risks of abuse, dependence, addiction and diversion.     Is the patient prescribed a combination of a benzodiazepine and opioid? No  Patient tolerating without AE. Benefit outweighs the risk. Discussed risks/benefits with patient. All questions answered. States understanding.     Last Urine Drug Screen / ordered today: Yes  Recent Results (from the past 8760 hour(s))   Benzodiazepine Confirmation, Urine    Collection Time: 08/15/23  3:49 PM   Result Value Ref Range    7-Aminoclonazepam <25 Cutoff <25 ng/mL    Alpha-Hydroxyalprazolam <25 Cutoff <25 ng/mL    Alpha-Hydroxymidazolam <25 Cutoff <25 ng/mL    Alprazolam <25 Cutoff <25 ng/mL    Chlordiazepoxide <25 Cutoff <25 ng/mL    Clonazepam <25 Cutoff <25 ng/mL    Diazepam <25 Cutoff <25 ng/mL    Lorazepam <25 Cutoff <25 ng/mL    Midazolam <25 Cutoff <25 ng/mL    Nordiazepam <25 Cutoff <25 ng/mL    Oxazepam 942 (A) Cutoff <25 ng/mL    Temazepam >1000 (A) Cutoff <25 ng/mL   Drug Screen, Urine With Reflex to Confirmation    Collection Time: 08/15/23  3:49 PM   Result Value Ref Range    DRUG SCREEN COMMENT URINE SEE BELOW     Amphetamine Screen, Urine PRESUMPTIVE NEGATIVE NEGATIVE    Barbiturate Screen, Urine PRESUMPTIVE NEGATIVE NEGATIVE    BENZODIAZEPINE (PRESENCE) IN URINE BY SCREEN METHOD PRESUMPTIVE POSITIVE (A) NEGATIVE    Cannabinoid Screen, Urine PRESUMPTIVE NEGATIVE NEGATIVE    Cocaine Screen, Urine PRESUMPTIVE NEGATIVE NEGATIVE    Fentanyl, Ur PRESUMPTIVE NEGATIVE NEGATIVE    Methadone Screen, Urine PRESUMPTIVE NEGATIVE NEGATIVE    Opiate Screen, Urine PRESUMPTIVE NEGATIVE NEGATIVE    Oxycodone Screen, Ur PRESUMPTIVE NEGATIVE NEGATIVE    PCP Screen, Urine PRESUMPTIVE NEGATIVE NEGATIVE     N/A    Controlled Substance Agreement:  Date of the Last Agreement:   Reviewed Controlled Substance Agreement including but not limited to the benefits, risks, and alternatives to treatment  with a Controlled Substance medication(s).

## 2023-10-03 NOTE — PROGRESS NOTES
Subjective   Patient ID: Saji Gordon is a 62 y.o. male who presents for Back Pain (Spinal stenosis to lumbar spine that causes constant pain.), Neck Pain (Stenosis to C-spine that is giving him sharp pain in bilateral neck.), Knee Pain (Torn ligament in left knee that causes pain that is occasionally dull, but can also be sharp in nature.), Foot Pain (Right heel spur and plantar fasciitis that causes him constant pain.), and Establish Care (Patient is here to establish care for pain management.  Patient has been a patient of pain management before.).      Patient has multiple joint pain.  He is referred to see us by his PCP, Dr. Carter.    He has pain to his neck that has been going for years.  He has some pain to his shoulders.  He denies numbness and tingling sensation to his arms.  He denies arm weakness, weakness in  or dropping objects.  He has pain to his lower back that has been ongoing for 20 years.  He rates his pain as 8 out of 10.  He describes it as aching, dull, pressure, tender and tightness kind of pain. He has numbness and tingling sensation to his legs.  It is aggravated with prolonged sitting and standing.  He reports he cannot walk or stand for short time.  He denies leg weakness or change in balance.  He denies bowel or bladder incontinence.  He denies recent falls, injuries, or inciting events.  He denies back injury or surgery.    Patient reports that he had seen pain management the past.  He was a patient of Dr. Christine then another pain provider in a different county.  He reports that he had epidural injection 3 times about 11 years ago and it did not work.  He had recent physical therapy done without relief.  This was in April 2023.  He was on gabapentin 1800 mg 3 times a day and stopped this 2-3 weeks ago.  He he was informed on how to wean himself off the gabapentin.  Patient mentioned that he was on oxycodone in the past that works well.  I discussed with him that I will not be  prescribing him the pain medication in his first visit.  I discussed spine referral however patient mentioned that he saw a spine surgeon 11 years ago.  He is going to make an appointment with him however he is going to [employer].  He mentioned that he is applying for a job in [employer].  He also mentioned that he is an .  He reported that he will find a pain provider when he gets this job.    Patient had MRI of his lumbar spine that was done on 7/6/2023 and I reviewed the results.  I discussed the plan of care.  Patient does not want any joint injections.  He also mentioned that he does not think he will follow-up with us as he is going to [employer].          Review of Systems   Constitutional: Negative.    HENT: Negative.     Eyes: Negative.    Respiratory: Negative.     Cardiovascular: Negative.    Gastrointestinal: Negative.    Endocrine: Negative.    Genitourinary: Negative.    Musculoskeletal:  Positive for back pain and myalgias.   Skin: Negative.    Allergic/Immunologic: Negative.    Neurological: Negative.    Psychiatric/Behavioral: Negative.         Objective       Patient Active Problem List   Diagnosis    Abnormal CT scan    Anxiety    BPH (benign prostatic hyperplasia)    Burn    Cervical spondyloarthritis    Chronic low back pain without sciatica    Chronic right upper quadrant pain    Delayed sleep phase syndrome    Dyslipidemia    Eustachian tube dysfunction    Gout    HTN (hypertension)    Chronic insomnia    Lower urinary tract symptoms    Lumbar and sacral spondylarthritis    Lung nodules    Multiple joint pain    Neck pain    Neuroforaminal stenosis of lumbar spine    Nicotine dependence    Numbness and tingling in left arm    Pain in right knee    JERAD (obstructive sleep apnea)    Tooth infection    UTI (urinary tract infection)    Abdominal pain    Current smoker    Hemangioma of skin and subcutaneous tissue    Herpesviral infection, unspecified    Neoplasm of uncertain behavior of skin    Other  hypertrophic disorders of the skin    Other melanin hyperpigmentation    Other seborrheic keratosis    Sebaceous cyst    Spinal stenosis of lumbar region with neurogenic claudication    Bilateral knee pain    Cervical radiculopathy    Nicotine dependence, cigarettes, with other nicotine-induced disorders    Class 1 obesity due to excess calories with serious comorbidity and body mass index (BMI) of 33.0 to 33.9 in adult    Class 1 obesity with body mass index (BMI) of 30.0 to 30.9 in adult    Class 1 obesity with body mass index (BMI) of 31.0 to 31.9 in adult    Class 1 obesity with body mass index (BMI) of 32.0 to 32.9 in adult    Gallstones    Lumbar radiculopathy    Nicotine addiction     Past Medical History:   Diagnosis Date    Frequency of urination 08/14/2023     Past Surgical History:   Procedure Laterality Date    OTHER SURGICAL HISTORY  10/28/2020    No history of surgery     Family History   Problem Relation Name Age of Onset    Other (CARDIAC DISORDER) Other MULTIPLE FAMNILY MEMBERS      Social History     Tobacco Use    Smoking status: Every Day     Packs/day: 0.50     Years: 40.00     Additional pack years: 0.00     Total pack years: 20.00     Types: Cigarettes     Passive exposure: Current    Smokeless tobacco: Never   Substance Use Topics    Alcohol use: Not Currently     Comment: Socially drinks    Drug use: Never       Physical Exam  Vitals and nursing note reviewed.   HENT:      Head: Normocephalic.      Nose: Nose normal.   Eyes:      Extraocular Movements: Extraocular movements intact.      Conjunctiva/sclera: Conjunctivae normal.      Pupils: Pupils are equal, round, and reactive to light.   Cardiovascular:      Rate and Rhythm: Normal rate and regular rhythm.   Pulmonary:      Effort: Pulmonary effort is normal.      Breath sounds: Normal breath sounds.   Musculoskeletal:         General: Tenderness present. No swelling, deformity or signs of injury.      Cervical back: No rigidity or  "tenderness.      Lumbar back: Spasms and tenderness present.      Right lower leg: No edema.      Left lower leg: No edema.      Comments: Positive for paraspinal tenderness at thoracic region.  Positive for paraspinal tenderness at the lumbar region bilaterally at L4-L5, L5-S1 with rotation.  Negative leg raise.  Intact range of motion.   Skin:     General: Skin is warm and dry.   Neurological:      General: No focal deficit present.      Mental Status: He is alert and oriented to person, place, and time.   Psychiatric:         Mood and Affect: Mood normal.         Behavior: Behavior normal.           2/28/2023     6:21 PM 3/9/2023     3:04 PM 3/21/2023     3:14 PM 4/6/2023     2:50 PM 8/15/2023     5:20 PM 8/15/2023     6:13 PM 10/3/2023     1:10 PM   Vitals   Systolic 96 128 114   90 118   Diastolic 60 72 77   60 77   Heart Rate 82 78 82    72   Temp  36.7 °C (98.1 °F) 36.5 °C (97.7 °F)    36.2 °C (97.1 °F)   Resp       17   Height (in) 1.88 m (6' 2\") 1.88 m (6' 2\") 1.88 m (6' 2\") 1.88 m (6' 2\") 1.88 m (6' 2\")  1.88 m (6' 2\")   Weight (lb) 234 235.38 232 233 220  225   BMI 30.04 kg/m2 30.22 kg/m2 29.79 kg/m2 29.92 kg/m2 28.25 kg/m2  28.89 kg/m2   BSA (m2) 2.35 m2 2.36 m2 2.34 m2 2.35 m2 2.28 m2  2.31 m2   Visit Report     Report Report Report       Assessment/Plan   Problem List Items Addressed This Visit             ICD-10-CM    Lumbar radiculopathy M54.16     Other Visit Diagnoses         Codes    Facet arthropathy, lumbosacral    -  Primary M47.817    Facet arthropathy, thoracic     M47.814    Chronic back pain greater than 3 months duration     M54.9, G89.29    Medication management     Z79.899    Relevant Orders    Drug Screen, Urine With Reflex to Confirmation (Completed)    Benzodiazepine Confirmation, Urine    OOB Internal Tracking              Pain Management Panel  More data exists         Latest Ref Rng & Units 10/3/2023 8/15/2023   Pain Management Panel   Amphetamine Screen, Urine Presumptive Negative " Presumptive Negative  PRESUMPTIVE NEGATIVE    Barbiturate Screen, Urine Presumptive Negative Presumptive Negative  PRESUMPTIVE NEGATIVE    Benzodiazepines Screen, Urine Presumptive Negative Presumptive Positive  -   Fentanyl Screen, Urine Presumptive Negative Presumptive Negative  PRESUMPTIVE NEGATIVE    Methadone Screen, Urine NEGATIVE - PRESUMPTIVE NEGATIVE        No images are attached to the encounter.                Keila Valenzuela, APRN-CNP, DNP

## 2023-10-10 LAB
1OH-MIDAZOLAM UR CFM-MCNC: <25 NG/ML
7AMINOCLONAZEPAM UR CFM-MCNC: <25 NG/ML
A-OH ALPRAZ UR CFM-MCNC: <25 NG/ML
ALPRAZ UR CFM-MCNC: <25 NG/ML
CHLORDIAZEP UR CFM-MCNC: <25 NG/ML
CLONAZEPAM UR CFM-MCNC: <25 NG/ML
DIAZEPAM UR CFM-MCNC: <25 NG/ML
LORAZEPAM UR CFM-MCNC: <25 NG/ML
MIDAZOLAM UR CFM-MCNC: <25 NG/ML
NORDIAZEPAM UR CFM-MCNC: <25 NG/ML
OXAZEPAM UR CFM-MCNC: >1000 NG/ML
TEMAZEPAM UR CFM-MCNC: >1000 NG/ML

## 2023-10-17 DIAGNOSIS — F51.01 PRIMARY INSOMNIA: ICD-10-CM

## 2023-10-18 DIAGNOSIS — M10.9 GOUT, UNSPECIFIED: ICD-10-CM

## 2023-10-18 DIAGNOSIS — M72.2 PLANTAR FASCIITIS: ICD-10-CM

## 2023-10-18 DIAGNOSIS — F51.01 PRIMARY INSOMNIA: ICD-10-CM

## 2023-10-18 RX ORDER — TEMAZEPAM 30 MG/1
30 CAPSULE ORAL NIGHTLY PRN
Qty: 30 CAPSULE | Refills: 0 | Status: SHIPPED | OUTPATIENT
Start: 2023-10-18 | End: 2023-10-19

## 2023-10-18 RX ORDER — ALLOPURINOL 300 MG/1
300 TABLET ORAL DAILY
Qty: 90 TABLET | Refills: 3 | Status: SHIPPED | OUTPATIENT
Start: 2023-10-18

## 2023-10-18 RX ORDER — TRAZODONE HYDROCHLORIDE 150 MG/1
150 TABLET ORAL NIGHTLY
Qty: 30 TABLET | Refills: 3 | Status: SHIPPED | OUTPATIENT
Start: 2023-10-18 | End: 2023-12-15 | Stop reason: SDUPTHER

## 2023-10-19 RX ORDER — TEMAZEPAM 30 MG/1
30 CAPSULE ORAL NIGHTLY PRN
Qty: 30 CAPSULE | Refills: 0 | Status: SHIPPED | OUTPATIENT
Start: 2023-10-19 | End: 2023-11-16 | Stop reason: SDUPTHER

## 2023-10-20 ENCOUNTER — APPOINTMENT (OUTPATIENT)
Dept: SURGERY | Facility: CLINIC | Age: 63
End: 2023-10-20
Payer: COMMERCIAL

## 2023-11-01 DIAGNOSIS — G89.29 CHRONIC LOW BACK PAIN WITHOUT SCIATICA, UNSPECIFIED BACK PAIN LATERALITY: ICD-10-CM

## 2023-11-01 DIAGNOSIS — M54.50 CHRONIC LOW BACK PAIN WITHOUT SCIATICA, UNSPECIFIED BACK PAIN LATERALITY: ICD-10-CM

## 2023-11-02 RX ORDER — CYCLOBENZAPRINE HCL 10 MG
10 TABLET ORAL 3 TIMES DAILY PRN
Qty: 90 TABLET | Refills: 0 | Status: SHIPPED | OUTPATIENT
Start: 2023-11-02

## 2023-11-14 ENCOUNTER — OFFICE VISIT (OUTPATIENT)
Dept: PRIMARY CARE | Facility: CLINIC | Age: 63
End: 2023-11-14
Payer: MEDICAID

## 2023-11-14 VITALS
TEMPERATURE: 97.1 F | HEART RATE: 87 BPM | SYSTOLIC BLOOD PRESSURE: 140 MMHG | DIASTOLIC BLOOD PRESSURE: 90 MMHG | BODY MASS INDEX: 29.78 KG/M2 | HEIGHT: 70 IN | WEIGHT: 208 LBS | OXYGEN SATURATION: 97 %

## 2023-11-14 DIAGNOSIS — I10 HYPERTENSION, UNSPECIFIED TYPE: ICD-10-CM

## 2023-11-14 DIAGNOSIS — F51.01 PRIMARY INSOMNIA: Primary | ICD-10-CM

## 2023-11-14 DIAGNOSIS — M47.812 OSTEOARTHRITIS OF CERVICAL SPINE, UNSPECIFIED SPINAL OSTEOARTHRITIS COMPLICATION STATUS: ICD-10-CM

## 2023-11-14 PROCEDURE — 3077F SYST BP >= 140 MM HG: CPT

## 2023-11-14 PROCEDURE — 99204 OFFICE O/P NEW MOD 45 MIN: CPT

## 2023-11-14 PROCEDURE — 80346 BENZODIAZEPINES1-12: CPT

## 2023-11-14 PROCEDURE — 3080F DIAST BP >= 90 MM HG: CPT

## 2023-11-14 PROCEDURE — 80307 DRUG TEST PRSMV CHEM ANLYZR: CPT

## 2023-11-14 ASSESSMENT — ENCOUNTER SYMPTOMS
LOSS OF SENSATION IN FEET: 0
OCCASIONAL FEELINGS OF UNSTEADINESS: 0
DEPRESSION: 0

## 2023-11-14 NOTE — PATIENT INSTRUCTIONS
UDS and contract for temazepam for sleep  See pain manager  New cardiologist consulted  Sleep study ordered, see specialist following this study and results    Thank you for coming in today, if any questions or concerns arise, please call my office.   INDY Alexander-CNP

## 2023-11-14 NOTE — PROGRESS NOTES
"Subjective   Patient ID: Saji Gordon is a 63 y.o. male who presents for Establish Care (Saji is here to establish care. Does have a lesion on his right arm and will move to the left arm. Started about 6 months ago. Has seen pain management and was given Gabapentin and did not help. Has trouble sleeping due to pain. Needs a new cardiology. ).  Right arm skin lesion - monitor for now, likely benign  \"Switching from arm to arm\" no evidence of wound of left arm    Insomnia - uds, contract today for benzo therapy            Vitals:    11/14/23 1415   BP: 140/90   Pulse: 87   Temp: 36.2 °C (97.1 °F)   SpO2: 97%       Review of Systems    Objective   Physical Exam    Assessment/Plan   Problem List Items Addressed This Visit       Cervical spondyloarthritis    Relevant Orders    Referral to Pain Medicine    HTN (hypertension)    Relevant Orders    Referral to Cardiology     Other Visit Diagnoses       Primary insomnia    -  Primary    Relevant Orders    In-Center Sleep Study (Non-Sleep Provider Only)    Drug Screen, Urine With Reflex to Confirmation                 Thank you for coming in today, please call my office if you have any concerns or questions.     Gokul PUTNAM, CNP  "

## 2023-11-16 DIAGNOSIS — F51.01 PRIMARY INSOMNIA: ICD-10-CM

## 2023-11-16 RX ORDER — TEMAZEPAM 30 MG/1
30 CAPSULE ORAL NIGHTLY PRN
Qty: 30 CAPSULE | Refills: 0 | Status: SHIPPED | OUTPATIENT
Start: 2023-11-16 | End: 2023-12-18

## 2023-12-15 DIAGNOSIS — F51.01 PRIMARY INSOMNIA: ICD-10-CM

## 2023-12-15 RX ORDER — TRAZODONE HYDROCHLORIDE 150 MG/1
150 TABLET ORAL NIGHTLY
Qty: 30 TABLET | Refills: 3 | Status: SHIPPED | OUTPATIENT
Start: 2023-12-15 | End: 2024-01-12 | Stop reason: SDUPTHER

## 2023-12-18 RX ORDER — TEMAZEPAM 30 MG/1
30 CAPSULE ORAL NIGHTLY PRN
Qty: 30 CAPSULE | Refills: 0 | Status: SHIPPED | OUTPATIENT
Start: 2023-12-18 | End: 2024-02-13 | Stop reason: SDUPTHER

## 2024-01-05 DIAGNOSIS — M54.50 CHRONIC LOW BACK PAIN WITHOUT SCIATICA, UNSPECIFIED BACK PAIN LATERALITY: Primary | ICD-10-CM

## 2024-01-05 DIAGNOSIS — G89.29 CHRONIC LOW BACK PAIN WITHOUT SCIATICA, UNSPECIFIED BACK PAIN LATERALITY: Primary | ICD-10-CM

## 2024-01-11 ENCOUNTER — TELEPHONE (OUTPATIENT)
Dept: PRIMARY CARE | Facility: CLINIC | Age: 64
End: 2024-01-11
Payer: MEDICAID

## 2024-01-11 NOTE — TELEPHONE ENCOUNTER
Saji had called requesting his pain management referral to be sent to Dr. German office. I called Dr. German office at [phone] to get a fax number. They gave me the fax number and requested that along with the referral a med list, imaging (done within a year), and office notes referencing the reason for the referral.     He has not had any imaging done by you. There is some imaging that was done back in July but it was ordered by an Orthopedic provider and I am unable to send these.       Dr. German fax # [phone]

## 2024-01-12 ENCOUNTER — TELEPHONE (OUTPATIENT)
Dept: PRIMARY CARE | Facility: CLINIC | Age: 64
End: 2024-01-12
Payer: MEDICAID

## 2024-01-12 DIAGNOSIS — F51.01 PRIMARY INSOMNIA: ICD-10-CM

## 2024-01-12 RX ORDER — TRAZODONE HYDROCHLORIDE 150 MG/1
150 TABLET ORAL NIGHTLY
Qty: 30 TABLET | Refills: 3 | Status: SHIPPED | OUTPATIENT
Start: 2024-01-12 | End: 2024-02-12 | Stop reason: SDUPTHER

## 2024-01-15 DIAGNOSIS — I51.9 LV DYSFUNCTION: ICD-10-CM

## 2024-01-15 DIAGNOSIS — I25.10 CORONARY ARTERY DISEASE INVOLVING NATIVE CORONARY ARTERY OF NATIVE HEART WITHOUT ANGINA PECTORIS: ICD-10-CM

## 2024-01-16 RX ORDER — ATORVASTATIN CALCIUM 40 MG/1
TABLET, FILM COATED ORAL
Qty: 30 TABLET | Refills: 2 | Status: SHIPPED | OUTPATIENT
Start: 2024-01-16

## 2024-02-12 ENCOUNTER — OFFICE VISIT (OUTPATIENT)
Dept: PAIN MEDICINE | Facility: HOSPITAL | Age: 64
End: 2024-02-12
Payer: MEDICAID

## 2024-02-12 ENCOUNTER — TELEPHONE (OUTPATIENT)
Dept: PRIMARY CARE | Facility: CLINIC | Age: 64
End: 2024-02-12

## 2024-02-12 ENCOUNTER — APPOINTMENT (OUTPATIENT)
Dept: PAIN MEDICINE | Facility: HOSPITAL | Age: 64
End: 2024-02-12
Payer: MEDICAID

## 2024-02-12 VITALS
SYSTOLIC BLOOD PRESSURE: 115 MMHG | HEART RATE: 81 BPM | DIASTOLIC BLOOD PRESSURE: 81 MMHG | HEIGHT: 74 IN | BODY MASS INDEX: 3.34 KG/M2 | WEIGHT: 26 LBS | TEMPERATURE: 98.2 F

## 2024-02-12 DIAGNOSIS — Z79.899 MEDICATION MANAGEMENT: ICD-10-CM

## 2024-02-12 DIAGNOSIS — F51.01 PRIMARY INSOMNIA: ICD-10-CM

## 2024-02-12 DIAGNOSIS — Z53.21 PATIENT LEFT BEFORE EVALUATION BY PHYSICIAN: Primary | ICD-10-CM

## 2024-02-12 RX ORDER — TRAZODONE HYDROCHLORIDE 150 MG/1
150 TABLET ORAL NIGHTLY
Qty: 30 TABLET | Refills: 3 | Status: SHIPPED | OUTPATIENT
Start: 2024-02-12 | End: 2024-03-08 | Stop reason: SDUPTHER

## 2024-02-12 ASSESSMENT — PAIN SCALES - GENERAL: PAINLEVEL: 6

## 2024-02-12 NOTE — PROGRESS NOTES
Saji is a 63-year-old  male who is here for a follow-up visit.  Patient was initially scheduled as new patient however he was seen in our office in October.  During that visit he mentioned that he will not be coming back to our office as he is going to [place] and will be under the care of pain management in that area.  Patient mentioned that he did not realize this is the same office.  He prefers not to continue with this visit.

## 2024-02-12 NOTE — PROGRESS NOTES
I have personally reviewed the OARRS report for Saji Gordon   . I have considered the risks of abuse, dependence, addiction and diversion.   Is the patient prescribed a combination of a benzodiazepine and opioid? No  Patient tolerating without AE. Benefit outweighs the risk. Discussed risks/benefits with patient. All questions answered. States understanding.     Date of the last Controlled Substance Agreement: 10/3/2023       Last urine drug screening date/ordered today: 02/12/24     Results of last screen: Results as expected.       Last opioid risk screening date/ordered today: 10/3/2023      Pain Scale Screening:   Pain Assessment and Documentation Tool (PADT)   Date of Assessment: 2/12/2024  Analgesia:   Patient reports her pain level on average during the past week is 4on a 0 - 10 scale.   Patient reports that her pain level at its worst during the past week was 9 on a 0 -10 scale.   0% of pain has been relieved during the past week per patient   Patient states that the amount of pain relief she is now obtaining from her current pain reliever(s) is enough to make a real difference in her life.   Query to clinician: Is the patient's pain relief clinically significant? yes  Activities of Daily Living:   Physical functioning: worse  Family relationships: unchanged  Social relationships: unchanged  Mood: unchanged  Sleep patterns: worse  Overall functioning: worse  Adverse Events: No, Saji Gordon is not experiencing side effects from current pain reliever.  Patients overall severity of side effect:none  Specific Analgesic Plan: Continue present regimen.

## 2024-02-13 ENCOUNTER — APPOINTMENT (OUTPATIENT)
Dept: PRIMARY CARE | Facility: CLINIC | Age: 64
End: 2024-02-13
Payer: MEDICAID

## 2024-02-13 DIAGNOSIS — F51.01 PRIMARY INSOMNIA: ICD-10-CM

## 2024-02-13 RX ORDER — TEMAZEPAM 30 MG/1
30 CAPSULE ORAL NIGHTLY PRN
Qty: 30 CAPSULE | Refills: 0 | Status: SHIPPED | OUTPATIENT
Start: 2024-02-13 | End: 2024-03-08 | Stop reason: SDUPTHER

## 2024-02-27 RX ORDER — METOPROLOL SUCCINATE 25 MG/1
TABLET, EXTENDED RELEASE ORAL
Qty: 90 TABLET | Refills: 3 | Status: SHIPPED | OUTPATIENT
Start: 2024-02-27

## 2024-03-08 DIAGNOSIS — F51.01 PRIMARY INSOMNIA: ICD-10-CM

## 2024-03-08 RX ORDER — TEMAZEPAM 30 MG/1
30 CAPSULE ORAL NIGHTLY PRN
Qty: 30 CAPSULE | Refills: 0 | Status: SHIPPED | OUTPATIENT
Start: 2024-03-08 | End: 2024-04-11 | Stop reason: SDUPTHER

## 2024-03-08 RX ORDER — TRAZODONE HYDROCHLORIDE 150 MG/1
150 TABLET ORAL NIGHTLY
Qty: 30 TABLET | Refills: 3 | Status: SHIPPED | OUTPATIENT
Start: 2024-03-08 | End: 2024-04-11 | Stop reason: SDUPTHER

## 2024-03-31 DIAGNOSIS — I51.9 LV DYSFUNCTION: ICD-10-CM

## 2024-03-31 DIAGNOSIS — I25.10 CORONARY ARTERY DISEASE INVOLVING NATIVE CORONARY ARTERY OF NATIVE HEART WITHOUT ANGINA PECTORIS: ICD-10-CM

## 2024-04-01 RX ORDER — LISINOPRIL 10 MG/1
TABLET ORAL
Qty: 30 TABLET | Refills: 0 | Status: SHIPPED | OUTPATIENT
Start: 2024-04-01

## 2024-04-08 DIAGNOSIS — I51.9 LV DYSFUNCTION: ICD-10-CM

## 2024-04-08 DIAGNOSIS — I25.10 CORONARY ARTERY DISEASE INVOLVING NATIVE CORONARY ARTERY OF NATIVE HEART WITHOUT ANGINA PECTORIS: ICD-10-CM

## 2024-04-09 RX ORDER — LISINOPRIL 10 MG/1
TABLET ORAL
Qty: 30 TABLET | Refills: 0 | Status: SHIPPED | OUTPATIENT
Start: 2024-04-09

## 2024-04-11 DIAGNOSIS — F51.01 PRIMARY INSOMNIA: ICD-10-CM

## 2024-04-11 RX ORDER — TEMAZEPAM 30 MG/1
30 CAPSULE ORAL NIGHTLY PRN
Qty: 30 CAPSULE | Refills: 0 | Status: SHIPPED | OUTPATIENT
Start: 2024-04-11 | End: 2024-05-10 | Stop reason: SDUPTHER

## 2024-04-11 RX ORDER — TRAZODONE HYDROCHLORIDE 150 MG/1
150 TABLET ORAL NIGHTLY
Qty: 30 TABLET | Refills: 3 | Status: SHIPPED | OUTPATIENT
Start: 2024-04-11 | End: 2024-05-10 | Stop reason: SDUPTHER

## 2024-04-12 RX ORDER — TEMAZEPAM 30 MG/1
30 CAPSULE ORAL NIGHTLY PRN
Qty: 30 CAPSULE | Refills: 0 | OUTPATIENT
Start: 2024-04-12 | End: 2024-05-12

## 2024-05-10 DIAGNOSIS — F51.01 PRIMARY INSOMNIA: ICD-10-CM

## 2024-05-10 RX ORDER — TEMAZEPAM 30 MG/1
30 CAPSULE ORAL NIGHTLY PRN
Qty: 30 CAPSULE | Refills: 0 | Status: SHIPPED | OUTPATIENT
Start: 2024-05-10 | End: 2024-06-09

## 2024-05-10 RX ORDER — TRAZODONE HYDROCHLORIDE 150 MG/1
150 TABLET ORAL NIGHTLY
Qty: 30 TABLET | Refills: 3 | Status: SHIPPED | OUTPATIENT
Start: 2024-05-10

## 2024-05-14 ENCOUNTER — OFFICE VISIT (OUTPATIENT)
Dept: PRIMARY CARE | Facility: CLINIC | Age: 64
End: 2024-05-14
Payer: MEDICAID

## 2024-05-14 VITALS
BODY MASS INDEX: 26.58 KG/M2 | DIASTOLIC BLOOD PRESSURE: 80 MMHG | TEMPERATURE: 97.9 F | HEART RATE: 79 BPM | OXYGEN SATURATION: 97 % | WEIGHT: 207 LBS | SYSTOLIC BLOOD PRESSURE: 120 MMHG

## 2024-05-14 DIAGNOSIS — Z79.899 MEDICATION MANAGEMENT: ICD-10-CM

## 2024-05-14 DIAGNOSIS — Z91.148 CONTROLLED SUBSTANCE AGREEMENT BROKEN: Primary | ICD-10-CM

## 2024-05-14 PROCEDURE — 3079F DIAST BP 80-89 MM HG: CPT

## 2024-05-14 PROCEDURE — 3074F SYST BP LT 130 MM HG: CPT

## 2024-05-14 NOTE — PROGRESS NOTES
"Subjective   Patient ID: Saji Gordon is a 63 y.o. male who presents for Follow-up (Saji is here for his controlled medication. Would like to discuss getting flexeril, previous provider for this closed office. ).  Benzodiazepines:  What is the patient's goal of therapy? Sleep aid  Is this being achieved with current treatment? yes    Patient presents today for Benzodiazepine refill as well as pill count with Drug screening   Patient was notified by Medical assistant about the pill count and drug screening to be done today    Patient did ask about his flexeril, which he did     When I entered room I stated that we were completing pill count and drug screening today   Patient stated, \"I think I lost two of the Pills\"    Pill count with verification with ARLEY Paulson, patient had 25 pills on hand. Patient should have 26 on hand if taking appropriately.  Patient was then told he would not be able to receive controlled medication by this office due to being short on pill count  Per the agreement on file between provider and patient, this was a breach of contract.  Patient then stated \"it was wrong what your office did to Minda (Significant other of patient) and I will be contacting an .\"  Patient then left office without completing the appointment.    Notified Practice Lead.        Vitals:    05/14/24 1447   BP: 120/80   Pulse: 79   Temp: 36.6 °C (97.9 °F)   SpO2: 97%       Review of Systems    Objective   Physical Exam  Constitutional:       General: He is not in acute distress.  Neurological:      Mental Status: He is alert.   Psychiatric:         Mood and Affect: Affect is angry.         Assessment/Plan   Problem List Items Addressed This Visit       Controlled substance agreement broken - Primary     Other Visit Diagnoses       Medication management                     Thank you for coming in today, please call my office if you have any concerns or questions.     Gokul PUTNAM, CNP  "

## 2024-05-17 DIAGNOSIS — F51.01 PRIMARY INSOMNIA: ICD-10-CM

## 2024-05-20 DIAGNOSIS — I51.9 LV DYSFUNCTION: ICD-10-CM

## 2024-05-20 DIAGNOSIS — I25.10 CORONARY ARTERY DISEASE INVOLVING NATIVE CORONARY ARTERY OF NATIVE HEART WITHOUT ANGINA PECTORIS: ICD-10-CM

## 2024-05-20 RX ORDER — TEMAZEPAM 30 MG/1
30 CAPSULE ORAL NIGHTLY PRN
Qty: 30 CAPSULE | Refills: 0 | OUTPATIENT
Start: 2024-05-20 | End: 2024-06-19

## 2024-05-21 ENCOUNTER — OFFICE VISIT (OUTPATIENT)
Dept: CARDIOLOGY CLINIC | Age: 64
End: 2024-05-21
Payer: MEDICARE

## 2024-05-21 VITALS
RESPIRATION RATE: 18 BRPM | BODY MASS INDEX: 26.69 KG/M2 | DIASTOLIC BLOOD PRESSURE: 72 MMHG | HEIGHT: 74 IN | SYSTOLIC BLOOD PRESSURE: 98 MMHG | HEART RATE: 89 BPM | WEIGHT: 208 LBS

## 2024-05-21 DIAGNOSIS — I25.10 CORONARY ARTERY DISEASE INVOLVING NATIVE CORONARY ARTERY OF NATIVE HEART WITHOUT ANGINA PECTORIS: Primary | ICD-10-CM

## 2024-05-21 DIAGNOSIS — E78.00 PURE HYPERCHOLESTEROLEMIA: ICD-10-CM

## 2024-05-21 DIAGNOSIS — I25.5 ISCHEMIC CARDIOMYOPATHY: ICD-10-CM

## 2024-05-21 DIAGNOSIS — J44.9 CHRONIC OBSTRUCTIVE PULMONARY DISEASE, UNSPECIFIED COPD TYPE (HCC): ICD-10-CM

## 2024-05-21 PROCEDURE — 3023F SPIROM DOC REV: CPT | Performed by: INTERNAL MEDICINE

## 2024-05-21 PROCEDURE — G8417 CALC BMI ABV UP PARAM F/U: HCPCS | Performed by: INTERNAL MEDICINE

## 2024-05-21 PROCEDURE — 4004F PT TOBACCO SCREEN RCVD TLK: CPT | Performed by: INTERNAL MEDICINE

## 2024-05-21 PROCEDURE — 99215 OFFICE O/P EST HI 40 MIN: CPT | Performed by: INTERNAL MEDICINE

## 2024-05-21 PROCEDURE — 93000 ELECTROCARDIOGRAM COMPLETE: CPT | Performed by: INTERNAL MEDICINE

## 2024-05-21 PROCEDURE — G8427 DOCREV CUR MEDS BY ELIG CLIN: HCPCS | Performed by: INTERNAL MEDICINE

## 2024-05-21 PROCEDURE — 3017F COLORECTAL CA SCREEN DOC REV: CPT | Performed by: INTERNAL MEDICINE

## 2024-05-21 RX ORDER — ATORVASTATIN CALCIUM 40 MG/1
TABLET, FILM COATED ORAL
Qty: 30 TABLET | Refills: 0 | Status: SHIPPED | OUTPATIENT
Start: 2024-05-21

## 2024-05-21 NOTE — PROGRESS NOTES
arise.    The patient's current medication list, allergies, problem list and results of all previously ordered testing were reviewed at today's visit.    Follow-up office visit in 1 year      Note: This report was completed using computerized voice recognition software. Every effort has been made to ensure accuracy, however; inadvertent computerized transcription errors may be present.    Anton Mansfield MD  [facility]

## 2024-05-22 ENCOUNTER — TELEPHONE (OUTPATIENT)
Dept: FAMILY MEDICINE CLINIC | Age: 64
End: 2024-05-22

## 2024-05-22 NOTE — TELEPHONE ENCOUNTER
Left vm msg I was returning pt's call.    ----- Message from Peyman Mannmandy Valenzuela sent at 5/22/2024 10:59 AM EDT -----  Regarding: [facility] Appointment Request  [facility] Appointment Request    Patient needs appointment for [facility] Appointment Type: New to Provider. ( DR. Bora Fraser, Pt wanted to have an appointment with Dr Fraser within this week or next anytime in the afternoon.)    Reason for Appointment Request: Available appointments did not meet patient need  --------------------------------------------------------------------------------------------------------------------------    Relationship to Patient: Self     Call Back Information: OK to leave message on voicemail  Preferred Call Back Number: Phone number ; [phone]

## 2024-05-31 ENCOUNTER — TELEPHONE (OUTPATIENT)
Dept: CARDIOLOGY | Age: 64
End: 2024-05-31

## 2024-05-31 NOTE — TELEPHONE ENCOUNTER
Left message on voice mail to remind patient of lexiscan stress test appointment on 6/4/24 at 0930. Instructions for test left on voice mail. Asked patient to call with any questions or if unable to keep appointment.

## 2024-06-04 ENCOUNTER — HOSPITAL ENCOUNTER (OUTPATIENT)
Dept: CARDIOLOGY | Age: 64
Discharge: HOME | End: 2024-06-06
Attending: INTERNAL MEDICINE
Payer: MEDICAID

## 2024-06-04 VITALS
HEART RATE: 84 BPM | BODY MASS INDEX: 25.67 KG/M2 | WEIGHT: 200 LBS | SYSTOLIC BLOOD PRESSURE: 98 MMHG | DIASTOLIC BLOOD PRESSURE: 70 MMHG | HEIGHT: 74 IN

## 2024-06-04 DIAGNOSIS — I25.10 CORONARY ARTERY DISEASE INVOLVING NATIVE CORONARY ARTERY OF NATIVE HEART WITHOUT ANGINA PECTORIS: ICD-10-CM

## 2024-06-04 LAB
ECHO BSA: 2.18 M2
NUC REST EJECTION FRACTION: 51 %
STRESS BASELINE DIAS BP: 70 MMHG
STRESS BASELINE HR: 66 BPM
STRESS BASELINE ST DEPRESSION: 0 MM
STRESS BASELINE SYS BP: 98 MMHG
STRESS ESTIMATED WORKLOAD: 1 METS
STRESS EXERCISE DUR MIN: 2 MIN
STRESS EXERCISE DUR SEC: 0 SEC
STRESS PEAK DIAS BP: 63 MMHG
STRESS PEAK SYS BP: 131 MMHG
STRESS PERCENT HR ACHIEVED: 87 %
STRESS POST PEAK HR: 136 BPM
STRESS RATE PRESSURE PRODUCT: NORMAL BPM*MMHG
STRESS ST DEPRESSION: 0 MM
STRESS TARGET HR: 157 BPM
TID: 1.08

## 2024-06-04 PROCEDURE — 93017 CV STRESS TEST TRACING ONLY: CPT

## 2024-06-04 PROCEDURE — A9502 TC99M TETROFOSMIN: HCPCS | Performed by: INTERNAL MEDICINE

## 2024-06-04 PROCEDURE — 6360000002 HC RX W HCPCS: Performed by: INTERNAL MEDICINE

## 2024-06-04 PROCEDURE — 2580000003 HC RX 258: Performed by: INTERNAL MEDICINE

## 2024-06-04 PROCEDURE — 3430000000 HC RX DIAGNOSTIC RADIOPHARMACEUTICAL: Performed by: INTERNAL MEDICINE

## 2024-06-04 RX ORDER — SODIUM CHLORIDE 0.9 % (FLUSH) 0.9 %
10 SYRINGE (ML) INJECTION PRN
Status: DISCONTINUED | OUTPATIENT
Start: 2024-06-04 | End: 2024-06-07 | Stop reason: HOSPADM

## 2024-06-04 RX ORDER — REGADENOSON 0.08 MG/ML
0.4 INJECTION, SOLUTION INTRAVENOUS
Status: COMPLETED | OUTPATIENT
Start: 2024-06-04 | End: 2024-06-04

## 2024-06-04 RX ADMIN — SODIUM CHLORIDE, PRESERVATIVE FREE 10 ML: 5 INJECTION INTRAVENOUS at 09:38

## 2024-06-04 RX ADMIN — TETROFOSMIN 35 MILLICURIE: 0.23 INJECTION, POWDER, LYOPHILIZED, FOR SOLUTION INTRAVENOUS at 11:36

## 2024-06-04 RX ADMIN — TETROFOSMIN 10.5 MILLICURIE: 0.23 INJECTION, POWDER, LYOPHILIZED, FOR SOLUTION INTRAVENOUS at 09:37

## 2024-06-04 RX ADMIN — REGADENOSON 0.4 MG: 0.08 INJECTION, SOLUTION INTRAVENOUS at 11:36

## 2024-06-04 RX ADMIN — SODIUM CHLORIDE, PRESERVATIVE FREE 10 ML: 5 INJECTION INTRAVENOUS at 11:36

## 2024-06-05 ENCOUNTER — TELEPHONE (OUTPATIENT)
Dept: CARDIOLOGY CLINIC | Age: 64
End: 2024-06-05

## 2024-06-05 DIAGNOSIS — I47.10 PAROXYSMAL SUPRAVENTRICULAR TACHYCARDIA (HCC): Primary | ICD-10-CM

## 2024-06-05 NOTE — TELEPHONE ENCOUNTER
----- Message from Anton Mansfield MD sent at 6/5/2024  6:38 AM EDT -----  Please notify patient that stress test results have been reviewed and suggest no changes of his arteries.  Based on transient episode of irregular heartbeat during test please arrange for 14-day ZIO XT monitor for evaluation of paroxysmal supraventricular tachycardia

## 2024-06-12 DIAGNOSIS — I10 PRIMARY HYPERTENSION: ICD-10-CM

## 2024-06-12 RX ORDER — LISINOPRIL 5 MG/1
5 TABLET ORAL DAILY
Qty: 30 TABLET | Refills: 11 | OUTPATIENT
Start: 2024-06-12

## 2024-06-20 ENCOUNTER — NURSE ONLY (OUTPATIENT)
Dept: CARDIOLOGY CLINIC | Age: 64
End: 2024-06-20

## 2024-06-20 NOTE — PROGRESS NOTES
Patient was seen today and a 14 day monitor was placed. Monitor was ordered by Dr. Mansfield. The monitor was applied, instructions were given to the patient. Patient stated understanding and gave verbalize readback.   Monitor company: [employer]  Serial number: [id number]

## 2024-07-11 ENCOUNTER — TELEPHONE (OUTPATIENT)
Dept: CARDIOLOGY CLINIC | Age: 64
End: 2024-07-11

## 2024-07-11 DIAGNOSIS — I47.10 SUPRAVENTRICULAR TACHYCARDIA (HCC): ICD-10-CM

## 2024-07-11 DIAGNOSIS — I47.10 SUPRAVENTRICULAR TACHYCARDIA (HCC): Primary | ICD-10-CM

## 2024-07-11 NOTE — TELEPHONE ENCOUNTER
----- Message from Anton Mansfield MD sent at 7/10/2024  6:39 AM EDT -----  Please notify patient that arrhythmia monitor was reviewed and demonstrated short episodes of a rapid heartbeat similar to that noted during his stress test which are presently appropriate controlled with his medications.  Recommend continuation of existing therapy and follow-up as scheduled

## 2024-07-12 DIAGNOSIS — I51.9 LV DYSFUNCTION: ICD-10-CM

## 2024-07-12 DIAGNOSIS — I25.10 CORONARY ARTERY DISEASE INVOLVING NATIVE CORONARY ARTERY OF NATIVE HEART WITHOUT ANGINA PECTORIS: ICD-10-CM

## 2024-07-15 RX ORDER — LISINOPRIL 10 MG/1
TABLET ORAL
Qty: 90 TABLET | Refills: 3 | Status: SHIPPED | OUTPATIENT
Start: 2024-07-15

## 2024-08-04 DIAGNOSIS — I51.9 LV DYSFUNCTION: ICD-10-CM

## 2024-08-04 DIAGNOSIS — I25.10 CORONARY ARTERY DISEASE INVOLVING NATIVE CORONARY ARTERY OF NATIVE HEART WITHOUT ANGINA PECTORIS: ICD-10-CM

## 2024-08-06 RX ORDER — ATORVASTATIN CALCIUM 40 MG/1
TABLET, FILM COATED ORAL
Qty: 90 TABLET | Refills: 3 | Status: SHIPPED | OUTPATIENT
Start: 2024-08-06

## 2024-09-09 DIAGNOSIS — M10.9 GOUT, UNSPECIFIED: ICD-10-CM

## 2024-09-09 RX ORDER — ALLOPURINOL 300 MG/1
300 TABLET ORAL DAILY
Qty: 90 TABLET | Refills: 3 | OUTPATIENT
Start: 2024-09-09

## 2024-09-12 DIAGNOSIS — I10 ESSENTIAL (PRIMARY) HYPERTENSION: ICD-10-CM

## 2024-09-12 DIAGNOSIS — I10 PRIMARY HYPERTENSION: ICD-10-CM

## 2024-09-12 RX ORDER — LISINOPRIL 5 MG/1
5 TABLET ORAL DAILY
Qty: 30 TABLET | Refills: 11 | OUTPATIENT
Start: 2024-09-12

## 2024-09-15 RX ORDER — LISINOPRIL 5 MG/1
5 TABLET ORAL DAILY
Qty: 30 TABLET | Refills: 11 | Status: SHIPPED | OUTPATIENT
Start: 2024-09-15

## 2024-10-02 ENCOUNTER — OFFICE VISIT (OUTPATIENT)
Dept: CARDIOLOGY | Facility: CLINIC | Age: 64
End: 2024-10-02
Payer: MEDICAID

## 2024-10-02 VITALS
HEIGHT: 73 IN | WEIGHT: 206 LBS | OXYGEN SATURATION: 100 % | BODY MASS INDEX: 27.3 KG/M2 | SYSTOLIC BLOOD PRESSURE: 107 MMHG | HEART RATE: 57 BPM | DIASTOLIC BLOOD PRESSURE: 68 MMHG

## 2024-10-02 DIAGNOSIS — R06.09 DYSPNEA ON EXERTION: ICD-10-CM

## 2024-10-02 DIAGNOSIS — I25.110 CORONARY ARTERY DISEASE INVOLVING NATIVE CORONARY ARTERY OF NATIVE HEART WITH UNSTABLE ANGINA PECTORIS: ICD-10-CM

## 2024-10-02 DIAGNOSIS — R94.39 POSITIVE CARDIAC STRESS TEST: Primary | ICD-10-CM

## 2024-10-02 LAB
ATRIAL RATE: 78 BPM
P AXIS: 26 DEGREES
P OFFSET: 199 MS
P ONSET: 146 MS
PR INTERVAL: 136 MS
Q ONSET: 214 MS
QRS COUNT: 13 BEATS
QRS DURATION: 100 MS
QT INTERVAL: 388 MS
QTC CALCULATION(BAZETT): 442 MS
QTC FREDERICIA: 423 MS
R AXIS: 18 DEGREES
T AXIS: 19 DEGREES
T OFFSET: 408 MS
VENTRICULAR RATE: 78 BPM

## 2024-10-02 PROCEDURE — 3074F SYST BP LT 130 MM HG: CPT | Performed by: INTERNAL MEDICINE

## 2024-10-02 PROCEDURE — 93005 ELECTROCARDIOGRAM TRACING: CPT | Performed by: INTERNAL MEDICINE

## 2024-10-02 PROCEDURE — 99205 OFFICE O/P NEW HI 60 MIN: CPT | Performed by: INTERNAL MEDICINE

## 2024-10-02 PROCEDURE — 3008F BODY MASS INDEX DOCD: CPT | Performed by: INTERNAL MEDICINE

## 2024-10-02 PROCEDURE — 99215 OFFICE O/P EST HI 40 MIN: CPT | Performed by: INTERNAL MEDICINE

## 2024-10-02 PROCEDURE — 3078F DIAST BP <80 MM HG: CPT | Performed by: INTERNAL MEDICINE

## 2024-10-02 ASSESSMENT — PAIN SCALES - GENERAL: PAINLEVEL: 0-NO PAIN

## 2024-10-02 ASSESSMENT — COLUMBIA-SUICIDE SEVERITY RATING SCALE - C-SSRS
1. IN THE PAST MONTH, HAVE YOU WISHED YOU WERE DEAD OR WISHED YOU COULD GO TO SLEEP AND NOT WAKE UP?: NO
2. HAVE YOU ACTUALLY HAD ANY THOUGHTS OF KILLING YOURSELF?: NO
6. HAVE YOU EVER DONE ANYTHING, STARTED TO DO ANYTHING, OR PREPARED TO DO ANYTHING TO END YOUR LIFE?: NO

## 2024-10-02 ASSESSMENT — PATIENT HEALTH QUESTIONNAIRE - PHQ9
1. LITTLE INTEREST OR PLEASURE IN DOING THINGS: NOT AT ALL
SUM OF ALL RESPONSES TO PHQ9 QUESTIONS 1 AND 2: 0
2. FEELING DOWN, DEPRESSED OR HOPELESS: NOT AT ALL

## 2024-10-02 NOTE — PROGRESS NOTES
History Of Present Illness:    Saji Gordon is a 63 y.o. male with complex PMH (detailed below) presenting to outpatient cardiology clinic to establish care.  Recently followed with providers in [facility], some of the records available to me on care everywhere.  The patient reports significant decline in his functional capacity over the last few months, along with dyspnea on exertion associated with lightheadedness and feeling like his heart is racing.  Denies syncope, or changes in abdominal lower extremity edema.    Patient underwent nuclear stress test in 6/2024, which was positive.  Moderate severity reversible perfusion defect involving a medium to large portion of the inferior wall.  Consistent with abnormal perfusion of the right coronary artery territory.    Past Medical History:  Current everyday smoker, enrolled in smoking cessation through outside hospital  Hypertension/dyslipidemia/coronary artery disease  COPD    Review of Systems   Constitutional:  No Weight Change, No Fever, No Chills, No Night Sweats, No Fatigue, No Malaise   ENT/Mouth:  No Hearing Changes, No Ear Pain, No Nasal Congestion, No  Sinus Pain, No Hoarseness, No sore throat, No Rhinorrhea, No Swallowing  Difficulty   Eyes:  No Eye Pain, No Swelling, No Redness, No Foreign Body, No Discharge, No Vision Changes   Cardiovascular:  See HPI   Respiratory:  No Cough, No Sputum, No Wheezing, No Smoke Exposure, No Dyspnea   Gastrointestinal:  No Nausea, No Vomiting, No Diarrhea, No  Constipation, No Pain, No Heartburn, No Anorexia, No Dysphagia, No  Hematochezia, No Melena, No Flatulence, No Jaundice   Genitourinary:  No Dysmenorrhea, No DUB, No Dyspareunia, No Dysuria, No  Urinary Frequency, No Hematuria, No Urinary Incontinence, No Urgency,  No Flank Pain, No Urinary Flow Changes   Musculoskeletal:  No Arthralgias, No Myalgias, No Joint Swelling, No  Joint Stiffness, No Back Pain, No Neck Pain, No Injury History   Skin:  No Skin Lesions,  No Pruritis, No Hair Changes, No Breast/Skin Changes, No Nipple Discharge   Neuro:  No Weakness, No Numbness, No Paresthesias, No Loss of  Consciousness, No Syncope, No Dizziness, No Headache, No Coordination  Changes, No Recent Falls   Psych:  No Anxiety/Panic, No Depression, No Insomnia, No Delusions, No Rumination, No SI/HI/AH/VH, No Social Issues,  No Memory Changes, No Violence/Abuse Hx., No Eating Concerns   Heme/Lymph:  No Bruising, No Bleeding, No Transfusions History, No Lymphadenopathy   Endocrine:  No Polyuria, No Polydipsia, No Temperature Intolerance        Past Medical History:  He has a past medical history of Frequency of urination (08/14/2023).    Past Surgical History:  He has a past surgical history that includes Other surgical history (10/28/2020).      Social History:  He reports that he has been smoking cigarettes. He has a 40 pack-year smoking history. He has been exposed to tobacco smoke. He has never used smokeless tobacco. He reports that he does not drink alcohol and does not use drugs.    Family History:  Family History   Problem Relation Name Age of Onset    Other (CARDIAC DISORDER) Other MULTIPLE FAMNILY MEMBERS         Allergies:  Patient has no known allergies.    Outpatient Medications:  Current Outpatient Medications   Medication Instructions    allopurinol (ZYLOPRIM) 300 mg, oral, Daily, as directed    aspirin 81 mg EC tablet 1 tablet, oral, Daily    atorvastatin (LIPITOR) 40 mg, oral, Daily    cyclobenzaprine (FLEXERIL) 10 mg, oral, 3 times daily PRN    finasteride (Proscar) 5 mg tablet 1 tablet, oral, Daily    indomethacin (INDOCIN) 25 mg, oral, 3 times daily PRN    lisinopril 5 mg, oral, Daily    metoprolol succinate XL (Toprol-XL) 25 mg 24 hr tablet     omeprazole (PRILOSEC) 40 mg, oral, Daily    tamsulosin (FLOMAX) 0.4 mg, oral, Daily    temazepam (RESTORIL) 30 mg, oral, Nightly PRN    traZODone (DESYREL) 150 mg, oral, Nightly        Last Recorded Vitals:  Vitals:    10/02/24  "1518   BP: 107/68   BP Location: Left arm   Patient Position: Sitting   Pulse: 57   SpO2: 100%   Weight: 93.4 kg (206 lb)   Height: 1.854 m (6' 1\")       Physical Exam:  Physical exam  GEN: calm, cooperative, asking appropriate questions  NEURO: Alert and oriented x3, CN2-12 intact, SILT, Moving all extremities without difficulty  HEENT: atraumatic, no goiter, no JVD, normal uvula   CARDS: PMI is non-displaced, RRR, no MRG  PULM: CTAB, no wheezes / rales / rhonchi   ABD: soft, non-distended, non-tender, non-tympanitic, BS in all 4 quadrants. No hepatosplenomegaly  : unremarkable  SKIN: healthy appearing, normal skin turgor   EXT: atraumatic  VASC: b/l radial pulses are brisk and equal            Last Labs:  CBC -  Lab Results   Component Value Date    WBC 12.0 (H) 08/24/2023    HGB 16.4 08/24/2023    HCT 48.7 08/24/2023    MCV 98 08/24/2023     08/24/2023       CMP -  Lab Results   Component Value Date    CALCIUM 9.5 08/24/2023    PROT 7.5 08/24/2023    ALBUMIN 4.2 08/24/2023    AST 12 08/24/2023    ALT 11 08/24/2023    ALKPHOS 91 08/24/2023    BILITOT 0.7 08/24/2023       LIPID PANEL -   Lab Results   Component Value Date    CHOL 93 03/21/2023    HDL 30.8 (A) 03/21/2023    CHHDL 3.0 03/21/2023    VLDL 32 03/21/2023    TRIG 159 (H) 03/21/2023       RENAL FUNCTION PANEL -   Lab Results   Component Value Date    K 4.6 08/24/2023       No results found for: \"BNP\", \"HGBA1C\"        Last Cardiology Tests:    ECG:  No results found for this or any previous visit (from the past 4464 hour(s)).      Echo:  Echo Results:  No results found for this or any previous visit from the past 365 days.       Ejection Fractions:  No results found for: \"EF\"    Cath:  No results found for this or any previous visit from the past 365 days.        Stress Test:  Stress Results:  No results found for this or any previous visit from the past 365 days.       Cardiac Imaging:  US right upper quadrant  Narrative: Interpreted By:  MANDO, " MD DAYANA  MRN: [id number]  Patient Name: RUBIO NARAYAN     STUDY:  US RUQ ABDOMEN;  8/28/2023 1:28 pm     INDICATION:  right upper quadrant pain  R10.11: Chronic right upper quadrant pain  G89.29:.     COMPARISON:  None.     ACCESSION NUMBER(S):  [id number]     ORDERING CLINICIAN:  YVAN ESPINOZA     TECHNIQUE:  Multiple images of the right upper quadrant were obtained.     FINDINGS:  LIVER:  Largest at 17.8 cm in length with diffusely increased hepatic  echogenicity. No focal hepatic lesions are seen.     GALLBLADDER:  Cholelithiasis. No significant gallbladder wall thickening. Negative  sonographic Wahl sign reported by sonographer.     BILIARY TREE:  No intra or extrahepatic biliary dilatation is identified. The common  bile duct measures 3 mm.     PANCREAS:  The visualized pancreas is unremarkable in appearance.     RIGHT KIDNEY:  The right kidney is normal in size, measuring 10.7 cm in craniocaudal  dimension. The renal cortical echogenicity and thickness are within  normal limits. No hydronephrosis or renal calculi are seen.     Impression: Hepatomegaly with hepatic steatosis.     Cholelithiasis.     MACRO:  None      Assessment/Plan   This is a 63-year-old male with multiple cardiovascular risk factors here to establish care.  He recently had a nuclear stress test at an outside hospital which was positive for reversible perfusion defect in the RCA territory.  Over the last couple months he has noted a significant decline in his exertional capacity, with increasing shortness of breath associated with lightheadedness that is impacting his ability to complete his activities of daily living.  Will necessitate further evaluation, and I have placed an order for him to get invasive coronary angiography.    Please see detailed problem based assessment / plan below    # Positive nuclear stress test  -Invasive coronary angiography with left heart catheterization  -Continue aspirin 81 mg once daily  -Continue  atorvastatin 40 mg once daily  -Continue metoprolol succinate 25 mg once daily  -Blood pressure is borderline today in clinic with systolic blood pressures low 100s, so I will hold off on up titration of metoprolol at this time.    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  -Encouraged best practices when working on or near active electrical outlets    # Cardiovascular Health Maintenance  - Physical Activity: Encourage 10-15K steps per day  - Healthy Diet: Avoid high fat and high sodium foods (processed meats / fast foods)  --- consider a mediterranean or DASH diet, emphasizing vegetables, fruits, whole grains, lean proteins  - Avoidance of smoking and smoke exposure    Problem List Items Addressed This Visit    None  Visit Diagnoses         Codes    Positive cardiac stress test    -  Primary R94.39    Relevant Orders    Case Request Cath Lab: Left Heart Cath with Coronary Angiography and LV (Completed)    Basic Metabolic Panel    CBC    Protime-INR    Dyspnea on exertion     R06.09    Relevant Orders    Case Request Cath Lab: Left Heart Cath with Coronary Angiography and LV (Completed)    Basic Metabolic Panel    CBC    Protime-INR    Coronary artery disease involving native coronary artery of native heart with unstable angina pectoris     I25.110    Relevant Orders    ECG 12 lead (Clinic Performed)    Case Request Cath Lab: Left Heart Cath with Coronary Angiography and LV (Completed)    Basic Metabolic Panel    CBC    Protime-INR                Time Spent: I spent 40 minutes reviewing medical testing, obtaining medical history and counselling and educating on diagnosis and documenting clinical encounter.   C. Barton Gillombardo, MD  Interventional  Cardiology  Pager: [phone]

## 2024-10-03 ENCOUNTER — TELEPHONE (OUTPATIENT)
Dept: ADMINISTRATIVE | Age: 64
End: 2024-10-03

## 2024-10-03 PROBLEM — R06.09 DYSPNEA ON EXERTION: Status: ACTIVE | Noted: 2024-10-02

## 2024-10-03 PROBLEM — R94.39 POSITIVE CARDIAC STRESS TEST: Status: ACTIVE | Noted: 2024-10-02

## 2024-10-03 PROBLEM — I25.110 CORONARY ARTERY DISEASE INVOLVING NATIVE CORONARY ARTERY OF NATIVE HEART WITH UNSTABLE ANGINA PECTORIS: Status: ACTIVE | Noted: 2024-10-02

## 2024-10-03 NOTE — TELEPHONE ENCOUNTER
Patient is currently a former patient of Dr. Mansfield and went to a new cardiologist at  and he is demanding to talk to Dr. Mansfield personally because on his Nuclear stress test he had done on 6/4/24(that the new cardiologist read) it showed a blockage and now he needs to have a heart cath plus another procedure and he is appalled Dr. Mansfield did not find it.     He also never received results of his ZIO cardiac monitor from 7/11/24. I told him I'm not going to be able to get ahold of anyone tonight and it'll have to be tomorrow.    Best time to get ahold of him is in the afternoon.

## 2024-10-07 RX ORDER — SODIUM CHLORIDE 9 MG/ML
100 INJECTION, SOLUTION INTRAVENOUS CONTINUOUS
Status: CANCELLED | OUTPATIENT
Start: 2024-10-07

## 2024-10-08 ENCOUNTER — HOSPITAL ENCOUNTER (OUTPATIENT)
Facility: HOSPITAL | Age: 64
Setting detail: OUTPATIENT SURGERY
Discharge: HOME | End: 2024-10-08
Attending: INTERNAL MEDICINE | Admitting: INTERNAL MEDICINE
Payer: MEDICAID

## 2024-10-08 VITALS
BODY MASS INDEX: 27.47 KG/M2 | SYSTOLIC BLOOD PRESSURE: 110 MMHG | DIASTOLIC BLOOD PRESSURE: 65 MMHG | RESPIRATION RATE: 16 BRPM | TEMPERATURE: 97.9 F | HEART RATE: 75 BPM | HEIGHT: 73 IN | WEIGHT: 207.23 LBS | OXYGEN SATURATION: 95 %

## 2024-10-08 DIAGNOSIS — R94.39 POSITIVE CARDIAC STRESS TEST: Primary | ICD-10-CM

## 2024-10-08 DIAGNOSIS — R06.09 DYSPNEA ON EXERTION: ICD-10-CM

## 2024-10-08 DIAGNOSIS — I25.110 CORONARY ARTERY DISEASE INVOLVING NATIVE CORONARY ARTERY OF NATIVE HEART WITH UNSTABLE ANGINA PECTORIS: ICD-10-CM

## 2024-10-08 DIAGNOSIS — I25.82 CHRONIC TOTAL OCCLUSION OF CORONARY ARTERY: ICD-10-CM

## 2024-10-08 DIAGNOSIS — I20.0 UNSTABLE ANGINA (MULTI): ICD-10-CM

## 2024-10-08 LAB
ANION GAP SERPL CALC-SCNC: 11 MMOL/L (ref 10–20)
BUN SERPL-MCNC: 13 MG/DL (ref 6–23)
CALCIUM SERPL-MCNC: 8.7 MG/DL (ref 8.6–10.3)
CHLORIDE SERPL-SCNC: 106 MMOL/L (ref 98–107)
CO2 SERPL-SCNC: 27 MMOL/L (ref 21–32)
CREAT SERPL-MCNC: 0.99 MG/DL (ref 0.5–1.3)
EGFRCR SERPLBLD CKD-EPI 2021: 86 ML/MIN/1.73M*2
ERYTHROCYTE [DISTWIDTH] IN BLOOD BY AUTOMATED COUNT: 13.7 % (ref 11.5–14.5)
GLUCOSE SERPL-MCNC: 68 MG/DL (ref 74–99)
HCT VFR BLD AUTO: 45.9 % (ref 41–52)
HGB BLD-MCNC: 15.3 G/DL (ref 13.5–17.5)
MCH RBC QN AUTO: 32.8 PG (ref 26–34)
MCHC RBC AUTO-ENTMCNC: 33.3 G/DL (ref 32–36)
MCV RBC AUTO: 99 FL (ref 80–100)
NRBC BLD-RTO: 0 /100 WBCS (ref 0–0)
PLATELET # BLD AUTO: 182 X10*3/UL (ref 150–450)
POTASSIUM SERPL-SCNC: 3.9 MMOL/L (ref 3.5–5.3)
RBC # BLD AUTO: 4.66 X10*6/UL (ref 4.5–5.9)
SODIUM SERPL-SCNC: 140 MMOL/L (ref 136–145)
WBC # BLD AUTO: 9.8 X10*3/UL (ref 4.4–11.3)

## 2024-10-08 PROCEDURE — 2500000005 HC RX 250 GENERAL PHARMACY W/O HCPCS: Performed by: INTERNAL MEDICINE

## 2024-10-08 PROCEDURE — 93458 L HRT ARTERY/VENTRICLE ANGIO: CPT | Performed by: INTERNAL MEDICINE

## 2024-10-08 PROCEDURE — C1760 CLOSURE DEV, VASC: HCPCS | Performed by: INTERNAL MEDICINE

## 2024-10-08 PROCEDURE — 2720000007 HC OR 272 NO HCPCS: Performed by: INTERNAL MEDICINE

## 2024-10-08 PROCEDURE — 96373 THER/PROPH/DIAG INJ IA: CPT | Performed by: INTERNAL MEDICINE

## 2024-10-08 PROCEDURE — 7100000010 HC PHASE TWO TIME - EACH INCREMENTAL 1 MINUTE: Performed by: INTERNAL MEDICINE

## 2024-10-08 PROCEDURE — 99152 MOD SED SAME PHYS/QHP 5/>YRS: CPT | Performed by: INTERNAL MEDICINE

## 2024-10-08 PROCEDURE — 36415 COLL VENOUS BLD VENIPUNCTURE: CPT | Performed by: NURSE PRACTITIONER

## 2024-10-08 PROCEDURE — 85027 COMPLETE CBC AUTOMATED: CPT | Performed by: NURSE PRACTITIONER

## 2024-10-08 PROCEDURE — 2500000004 HC RX 250 GENERAL PHARMACY W/ HCPCS (ALT 636 FOR OP/ED): Performed by: INTERNAL MEDICINE

## 2024-10-08 PROCEDURE — 99222 1ST HOSP IP/OBS MODERATE 55: CPT | Performed by: NURSE PRACTITIONER

## 2024-10-08 PROCEDURE — 7100000009 HC PHASE TWO TIME - INITIAL BASE CHARGE: Performed by: INTERNAL MEDICINE

## 2024-10-08 PROCEDURE — 80048 BASIC METABOLIC PNL TOTAL CA: CPT | Performed by: NURSE PRACTITIONER

## 2024-10-08 RX ORDER — MIDAZOLAM HYDROCHLORIDE 1 MG/ML
INJECTION, SOLUTION INTRAMUSCULAR; INTRAVENOUS AS NEEDED
Status: DISCONTINUED | OUTPATIENT
Start: 2024-10-08 | End: 2024-10-08 | Stop reason: HOSPADM

## 2024-10-08 RX ORDER — FENTANYL CITRATE 50 UG/ML
INJECTION, SOLUTION INTRAMUSCULAR; INTRAVENOUS AS NEEDED
Status: DISCONTINUED | OUTPATIENT
Start: 2024-10-08 | End: 2024-10-08 | Stop reason: HOSPADM

## 2024-10-08 RX ORDER — NITROGLYCERIN 40 MG/100ML
INJECTION INTRAVENOUS AS NEEDED
Status: DISCONTINUED | OUTPATIENT
Start: 2024-10-08 | End: 2024-10-08 | Stop reason: HOSPADM

## 2024-10-08 RX ORDER — ACETAMINOPHEN 160 MG/5ML
650 SOLUTION ORAL EVERY 6 HOURS PRN
Status: DISCONTINUED | OUTPATIENT
Start: 2024-10-08 | End: 2024-10-08 | Stop reason: HOSPADM

## 2024-10-08 RX ORDER — HEPARIN SODIUM 1000 [USP'U]/ML
INJECTION, SOLUTION INTRAVENOUS; SUBCUTANEOUS AS NEEDED
Status: DISCONTINUED | OUTPATIENT
Start: 2024-10-08 | End: 2024-10-08 | Stop reason: HOSPADM

## 2024-10-08 RX ORDER — LIDOCAINE HYDROCHLORIDE 10 MG/ML
INJECTION, SOLUTION EPIDURAL; INFILTRATION; INTRACAUDAL; PERINEURAL AS NEEDED
Status: DISCONTINUED | OUTPATIENT
Start: 2024-10-08 | End: 2024-10-08 | Stop reason: HOSPADM

## 2024-10-08 RX ORDER — MIDAZOLAM HYDROCHLORIDE 1 MG/ML
INJECTION INTRAMUSCULAR; INTRAVENOUS AS NEEDED
Status: DISCONTINUED | OUTPATIENT
Start: 2024-10-08 | End: 2024-10-08 | Stop reason: HOSPADM

## 2024-10-08 RX ORDER — NAPROXEN SODIUM 220 MG/1
81 TABLET, FILM COATED ORAL ONCE
Status: DISCONTINUED | OUTPATIENT
Start: 2024-10-08 | End: 2024-10-08 | Stop reason: HOSPADM

## 2024-10-08 RX ORDER — ACETAMINOPHEN 650 MG/1
650 SUPPOSITORY RECTAL EVERY 6 HOURS PRN
Status: DISCONTINUED | OUTPATIENT
Start: 2024-10-08 | End: 2024-10-08 | Stop reason: HOSPADM

## 2024-10-08 RX ORDER — ACETAMINOPHEN 325 MG/1
650 TABLET ORAL EVERY 6 HOURS PRN
Status: DISCONTINUED | OUTPATIENT
Start: 2024-10-08 | End: 2024-10-08 | Stop reason: HOSPADM

## 2024-10-08 ASSESSMENT — PAIN SCALES - GENERAL
PAINLEVEL_OUTOF10: 0 - NO PAIN

## 2024-10-08 ASSESSMENT — ENCOUNTER SYMPTOMS
GASTROINTESTINAL NEGATIVE: 1
ENDOCRINE NEGATIVE: 1
CARDIOVASCULAR NEGATIVE: 1
EYES NEGATIVE: 1
PSYCHIATRIC NEGATIVE: 1
HEMATOLOGIC/LYMPHATIC NEGATIVE: 1
ALLERGIC/IMMUNOLOGIC NEGATIVE: 1
NEUROLOGICAL NEGATIVE: 1
MUSCULOSKELETAL NEGATIVE: 1
COUGH: 1
FATIGUE: 1

## 2024-10-08 ASSESSMENT — PAIN - FUNCTIONAL ASSESSMENT: PAIN_FUNCTIONAL_ASSESSMENT: 0-10

## 2024-10-08 ASSESSMENT — COLUMBIA-SUICIDE SEVERITY RATING SCALE - C-SSRS
2. HAVE YOU ACTUALLY HAD ANY THOUGHTS OF KILLING YOURSELF?: NO
1. IN THE PAST MONTH, HAVE YOU WISHED YOU WERE DEAD OR WISHED YOU COULD GO TO SLEEP AND NOT WAKE UP?: NO
6. HAVE YOU EVER DONE ANYTHING, STARTED TO DO ANYTHING, OR PREPARED TO DO ANYTHING TO END YOUR LIFE?: NO

## 2024-10-08 NOTE — H&P
History Of Present Illness  Saji Gordon is a 63 y.o. male presenting with abnormal stress test, here for [facility] with Dr. Gillombardo. PMHx significant for HTN, COPD, JERAD, current smoker, lumbar radiculopathy, HLD.     6/2024 stress test from OSH.   Moderate severity reversible perfusion defect involving a medium to large portion of the inferior wall.  Consistent with abnormal perfusion of the right coronary artery territory    Past Medical History:  Past Medical History:   Diagnosis Date    Frequency of urination 08/14/2023        Past Surgical History:  Past Surgical History:   Procedure Laterality Date    OTHER SURGICAL HISTORY  10/28/2020    No history of surgery          Social History:   reports that he has been smoking cigarettes. He has a 40 pack-year smoking history. He has been exposed to tobacco smoke. He has never used smokeless tobacco. He reports that he does not drink alcohol and does not use drugs.     Family History:  Family History   Problem Relation Name Age of Onset    Other (CARDIAC DISORDER) Other MULTIPLE FAMNILY MEMBERS         Allergies:  No Known Allergies     Home Medications:  Current Outpatient Medications   Medication Instructions    allopurinol (ZYLOPRIM) 300 mg, oral, Daily, as directed    aspirin 81 mg EC tablet 1 tablet, oral, Daily    atorvastatin (LIPITOR) 40 mg, oral, Daily    cyclobenzaprine (FLEXERIL) 10 mg, oral, 3 times daily PRN    finasteride (Proscar) 5 mg tablet 1 tablet, oral, Daily    lisinopril 5 mg, oral, Daily    metoprolol succinate XL (Toprol-XL) 25 mg 24 hr tablet     omeprazole (PRILOSEC) 40 mg, oral, Daily    tamsulosin (FLOMAX) 0.4 mg, oral, Daily    temazepam (RESTORIL) 30 mg, oral, Nightly PRN    traZODone (DESYREL) 150 mg, oral, Nightly       Inpatient Medications:  Scheduled medications   Medication Dose Route Frequency    aspirin  81 mg oral Once     PRN medications   Medication     Continuous Medications   Medication Dose Last Rate         Review of  "Systems   Constitutional:  Positive for fatigue.   HENT: Negative.     Eyes: Negative.    Respiratory:  Positive for cough.    Cardiovascular: Negative.    Gastrointestinal: Negative.    Endocrine: Negative.    Genitourinary: Negative.    Musculoskeletal: Negative.    Skin: Negative.    Allergic/Immunologic: Negative.    Neurological: Negative.    Hematological: Negative.    Psychiatric/Behavioral: Negative.            Physical Exam  General:  Patient is awake, alert, and oriented.  Patient is in no acute distress.  HEENT:  Pupils equal and reactive.  Normocephalic.  Moist mucosa.    Neck:  No JVD.    Cardiovascular:  Regular rate and rhythm.  Normal S1 and S2. No murmurs/rubs/gallops. Radial pulses 2+.   Pulmonary:  Clear to auscultation in upper lobes, diminished in bases.   Abdomen:  Soft. Non-tender.   Non-distended.  Positive bowel sounds.  Lower Extremities:  Pedal pulses 2+ No LE edema.  Neurologic:  Cranial nerves II-XII grossly intact.   No focal deficit.   Skin: Skin warm and dry, no lesions. Normal skin turgor.   Psychiatric: Normal affect.     Sedation Plan    ASA 3     Mallampati class: III.    Risks, benefits, and alternatives discussed with patient.         NPO since 2200    Last Recorded Vitals  Blood pressure 124/74, pulse 66, temperature 36.5 °C (97.7 °F), resp. rate 16, height 1.854 m (6' 1\"), weight 94 kg (207 lb 3.7 oz), SpO2 97%.         Vitals from the Past 24 Hours  Heart Rate:  [66]   Temp:  [36.5 °C (97.7 °F)]   Resp:  [16]   BP: (124)/(74)   Height:  [185.4 cm (6' 1\")]   Weight:  [94 kg (207 lb 3.7 oz)]   SpO2:  [97 %]          Relevant Results    Labs    CBC:   Recent Labs     10/08/24  0959 08/24/23  1643 03/21/23  1500 05/16/22  1203   WBC 9.8 12.0* 13.2* 11.8*   HGB 15.3 16.4 15.7 15.7   HCT 45.9 48.7 45.4 46.8    202 181 204   MCV 99 98 94 97     BMP/CMP:   Recent Labs     10/08/24  0959 08/24/23  1643 03/21/23  1500 05/16/22  1203    134* 136 142   K 3.9 4.6 4.4 3.8   CL " "106 100 103 105   BUN 13 13 9 16   CREATININE 0.99 1.19 1.00 0.99   CO2 27 30 29 29   CALCIUM 8.7 9.5 9.5 9.1   PROT  --  7.5 7.2 6.8   BILITOT  --  0.7 0.8 0.6   ALKPHOS  --  91 95 96   ALT  --  11 13 19   AST  --  12 12 15   GLUCOSE 68* 81 87 74      Lipid Panel:   Recent Labs     03/21/23  1500 05/16/22  1203   CHOL 93 107   HDL 30.8* 34.0*   CHHDL 3.0 3.1   VLDL 32 29   TRIG 159* 146     Cardiac       No lab exists for component: \"CK\", \"CKMBP\"   Hemoglobin A1C: No results for input(s): \"HGBA1C\" in the last 38480 hours.  TSH/ Free T4: No results for input(s): \"TSH\", \"FREET4\" in the last 89283 hours.  Iron: No results for input(s): \"FERRITIN\", \"TIBC\", \"IRONSAT\", \"BNP\" in the last 46562 hours.  Coag:     ABO: No results found for: \"ABO\"    Past Cardiology Tests (Last 3 Years):    EKG:  Recent Labs     10/02/24  1530   ATRRATE 78   VENTRATE 78   PRINT 136   QRSDUR 100   QTCFRED 423   QTCCALCB 442     Encounter Date: 10/02/24   ECG 12 lead (Clinic Performed)   Result Value    Ventricular Rate 78    Atrial Rate 78    AR Interval 136    QRS Duration 100    QT Interval 388    QTC Calculation(Bazett) 442    P Axis 26    R Axis 18    T Axis 19    QRS Count 13    Q Onset 214    P Onset 146    P Offset 199    T Offset 408    QTC Fredericia 423    Narrative    Sinus rhythm with occasional Premature ventricular complexes and Premature atrial complexes  Nonspecific T wave abnormality  Abnormal ECG  No previous ECGs available  Confirmed by Saji Bennett ([id number]) on 10/7/2024 10:39:53 AM     Echo:  Echocardiogram: No results found for this or any previous visit from the past 1800 days.    Ejection Fractions:  No results found for: \"EF\"  Cath:  Coronary Angiography: No results found for this or any previous visit from the past 1800 days.    Right Heart Cath: No results found for this or any previous visit from the past 1800 days.    Stress Test:  Nuclear:No results found for this or any previous visit from the past 1800 " days.    Metabolic Stress: No results found for this or any previous visit from the past 1800 days.    Cardiac Imaging:  Cardiac Scoring:   CT HEART CALCIUM SCORING WO IV CONTRAST 04/13/2021    Narrative  MRN: [id number]  Patient Name: RUBIO NARAYAN    STUDY:  CT CARDIAC SCORING;  4/13/2021 4:08 pm    INDICATION:  htn.    COMPARISON:  None.    ACCESSION NUMBER(S):  [id number]    ORDERING CLINICIAN:  ABAD OLGUIN    TECHNIQUE:  Using prospective ECG gating, CT scan of the coronary arteries was  performed without intravenous contrast. Coronary calcium scoring  was  performed according to the method of Agatston.    FINDINGS:  The score and distribution of calcium in the coronary arteries is as  follows:    LM            0  LAD           112.98  LCx           0  RCA           250.85    Total          363.83      The heart size is normal and there is no pericardial effusion. There  is mild dilatation of the ascending aorta measuring approximately 3.8  cm in the transaxial plane.    The 5 mm right middle lobe nodule is present on image 29 of series  203. This is probably an intrapulmonary node, However follow-up  within 1 year would be recommended if there are risk factors for  malignancy. The lungs otherwise are clear. the visualized upper  abdominal contents are within normal limits.  The bony structures are intact.    Impression  Coronary artery calcium score of  363.83.  Mild dilatation of the ascending aorta.  Right middle lobe nodule as described..    Coronary artery calcium scoring may be helpful in predicting the risk  for future coronary heart disease events.  According to the American  College of Cardiology Foundation Clinical Expert Consensus Task  Force, such testing provides important prognostic information in  patients with more than one coronary heart disease risk factor. The  coronary artery calcium score correlates with the annual risk of a  non-fatal myocardial infarction or coronary heart disease  "death.    Coronary artery score            Annual Risk    0-99                               0.4%  100-399                          1.3%  >400                              2.4%    These three \"breakpoints\" correspond to lower, intermediate and high  risk states for future coronary events.  Such information should be  used, along with appropriate clinical judgment, to make decisions  regarding the intensity of risk factor management strategies to treat  blood lipids and to modify other non-lipid coronary risk factors.    Reference: Frankewing P et al. Circulation.  2007; 115:402-426    Cardiac MRI: No results found for this or any previous visit from the past 1800 days.         Assessment/Plan  Assessment/Plan   Principal Problem:    Positive cardiac stress test  Active Problems:    Dyspnea on exertion    Coronary artery disease involving native coronary artery of native heart with unstable angina pectoris        #Abnormal Stress Test    -[facility] with Dr. Gillombardo today 10/8/24    NP discussed with Dr. Gillombardo regarding plan of care/ discharge plan      I spent 30 minutes in the professional and overall care of this patient.      Leigh Godoy, APRN-CNP    "

## 2024-10-08 NOTE — DISCHARGE INSTRUCTIONS
CARDIAC CATHETERIZATION DISCHARGE INSTRUCTIONS    Dr. Gillombardo's staff will reach out to you regarding scheduling cardiac catheterization/stenting procedure.      FOR SUDDEN AND SEVERE CHEST PAIN, SHORTNESS OF BREATH, EXCESSIVE BLEEDING, SIGNS OF STROKE, OR CHANGES IN MENTAL STATUS YOU SHOULD CALL 911 IMMEDIATELY.     If your provider has prescribed aspirin and/or clopidogrel (Plavix), or prasugrel (Effient), or ticagrelor (Brilinta), DO NOT STOP THESE MEDICATIONS for any reason without talking to your cardiologist first. If any of these were prescribed, you must take them every day without missing a single dose. If you are getting low on these medications, contact your provider immediately for a refill.     FOR NEXT 24 HOURS  - Upon discharge, you should return home and rest for the remainder of the day and evening. You do not have to stay on bed rest but should not be very active.  It is recommended a responsible adult be with you for the first 24 hours after the procedure.    - No driving for 24 hours after procedure. Please arrange for someone to drive you home from the hospital today.     - Do not drive, operate machinery, or use power tools for 24 hours after your procedure.     - Do not make any legal decisions for 24 hours after your procedure.     - Do not drink alcoholic beverages for 24 hours after your procedure.    -Stay extra hydrated for 24 hours after your procedure; goal fluid intake around 72-96 ounces for the next 24 hours.       WOUND CARE   *FOR FEMORAL (LEG) ACCESS*  ·      Avoid heavy lifting (over 10 pounds) for 7 days, squatting or excessive bending for 2 days, and strenuous exercise for 7 days.  ·      No submerged bathing, swimming, or hot tubs for the next 7 days, or until fully healed.  ·      Avoid sexual activity for 3-4 days until any groin discomfort has ceased.     *FOR RADIAL (WRIST) ACCESS*  ·      No lifting more than 5 pounds or excessive use of the wrist for 24 hours  - for example, treat your wrist as if it is sprained.  ·      Do not engage in vigorous activities (tennis, golf, bowling, weights) for at least 48 hours after the procedure.  ·      Do not submerge the wrist for 7 days after the procedure.  ·      You should expect mild tingling in your hand and tenderness at the puncture site for up to 3 days.    - The transparent dressing should be removed from the site 24 hours after the procedure.  Wash the site gently with soap and water. Rinse well and pat dry. Keep the area clean and dry. You may apply a Band-Aid to the site. Avoid lotions, ointments, or powders until fully healed.     - You may shower the day after your procedure.      - It is normal to notice a small bruise around the puncture site and/or a small grape sized or smaller lump. Any large bruising or large lump warrants a call to the office.     - If bleeding should occur, lay down and apply pressure to the affected area for 10 minutes.  If the bleeding stops notify your physician.  If there is a large amount of bleeding or spurting of blood CALL 911 immediately.  DO NOT drive yourself to the hospital.    - You may experience some tenderness, bruising or minimal inflammation.  If you have any concerns, you may contact the Cath Lab or if any of these symptoms become excessive, contact your cardiologist or go to the emergency room.     OTHER INSTRUCTIONS  - You may take acetaminophen (Tylenol) as directed for discomfort.  If pain is not relieved with acetaminophen (Tylenol), contact your doctor.    - If you notice or experience any of the following, you should notify your doctor or seek medical attention  Chest pain or discomfort  Change in mental status or weakness in extremities.  Dizziness, light headedness, or feeling faint.  Change in the site where the procedure was performed, such as bleeding or an increased area of bruising or swelling.  Tingling, numbness, pain, or coolness in the leg/arm beyond the  site where the procedure was performed.  Signs of infection (i.e. shaking chills, temperature > 100 degrees Fahrenheit, warmth, redness) in the leg/arm area where the procedure was performed.  Changes in urination   Bloody or black stools  Vomiting blood  Severe nose bleeds  Any excessive bleeding    - If you DO NOT have an appointment with your cardiologist within 2-4 weeks following your procedure, please contact their office.

## 2024-10-14 ENCOUNTER — TELEPHONE (OUTPATIENT)
Dept: CARDIOLOGY | Facility: CLINIC | Age: 64
End: 2024-10-14

## 2024-10-17 PROBLEM — I25.82 CHRONIC TOTAL OCCLUSION OF CORONARY ARTERY: Status: ACTIVE | Noted: 2024-10-03

## 2024-11-05 ENCOUNTER — TELEMEDICINE CLINICAL SUPPORT (OUTPATIENT)
Dept: CARDIAC REHAB | Facility: CLINIC | Age: 64
End: 2024-11-05
Payer: MEDICAID

## 2024-11-05 DIAGNOSIS — F17.210 NICOTINE DEPENDENCE, CIGARETTES, UNCOMPLICATED: ICD-10-CM

## 2024-11-05 PROCEDURE — 99407 BEHAV CHNG SMOKING > 10 MIN: CPT | Mod: GT,95 | Performed by: INTERNAL MEDICINE

## 2024-11-05 RX ORDER — ASPIRIN/CALCIUM CARB/MAGNESIUM 325 MG
4 TABLET ORAL EVERY 2 HOUR PRN
Qty: 100 LOZENGE | Refills: 0 | Status: SHIPPED | OUTPATIENT
Start: 2024-11-05 | End: 2024-12-05

## 2024-11-05 NOTE — PROGRESS NOTES
Tobacco Cessation Counseling Session Note    Name: Saji Gordon     MRN: [id number]      YOB: 1960   Age: 64 y.o.             Today’s Date: 11/5/2024  Primary Care Physician: Reagan Martin MD    Virtual or Telephone Consent    An interactive audio and video telecommunication system which permits real time communications between the patient (at home) and provider (at [facility]) was utilized to provide this telehealth service.     Verbal consent was requested and obtained from Saji Gordon on this date, 11/5/2024 for a telehealth visit.     Start Time: 3:07 PM  End Time: 3:54 PM  64 year old male presents for initial session for tobacco treatment counseling. Patient currently smoking 20-25 CPD and has been smoking since the age of 21. He has a high dependence on nicotine according to the [employer] nicotine dependence assessment. At this time, patient is motivated to quit smoking due to his overall health (PTCA November 21st). See below for detailed assessment of tobacco use and treatment plan.    CO level (@ time of appt.): NA    Smoking triggers/routines:  -driving  -drinking coffee  -after eating  -stress  -boredom  -habit/routine    Past quit attempts:  -quit for 1-2 weeks using Chantix  -relapsed due to stress   -has tried the gum before and didn't feel that it helped    Potential barriers to quitting:  -stress    Strengths:  -motivated  -past quit attempts  -great support system (wife)    Medication plan:  -Discussed NRT and medications available for treatment   -varenicline (states he will discuss with his PCP at his appointment this Friday for a RX)  -4mg lozenges (RX placed and discussed proper use)    Coping strategies:  -Substitutes/replacements: straws, toothpicks, candy, gum, suckers, ect.  -Distraction techniques-deep breathing, cleaning, playing games, taking a walk, ect.  -Positive thinking/positive self-talk  -Start a smoke  log to track his exact amount/triggers  -“Quitting Tobacco” educational booklet mailed to patient     Next steps:  -start varenicline  -start 4mg lozenges  -target quit date: November 21st    Follow-up scheduled next week to evaluate progress and make any necessary changes to quit plan. Patient to call office at [phone] with any questions/concerns.    Raegan Jovel, RRT

## 2024-11-14 ENCOUNTER — APPOINTMENT (OUTPATIENT)
Dept: CARDIAC REHAB | Facility: CLINIC | Age: 64
End: 2024-11-14
Payer: MEDICAID

## 2024-11-19 ENCOUNTER — TELEPHONE (OUTPATIENT)
Dept: CARDIOLOGY | Facility: CLINIC | Age: 64
End: 2024-11-19
Payer: MEDICAID

## 2024-11-20 ENCOUNTER — APPOINTMENT (OUTPATIENT)
Dept: CARDIAC REHAB | Facility: CLINIC | Age: 64
End: 2024-11-20
Payer: MEDICAID

## 2024-11-21 ENCOUNTER — HOSPITAL ENCOUNTER (OUTPATIENT)
Facility: HOSPITAL | Age: 64
Setting detail: OUTPATIENT SURGERY
Discharge: HOME | End: 2024-11-21
Attending: INTERNAL MEDICINE | Admitting: INTERNAL MEDICINE
Payer: MEDICAID

## 2024-11-21 ENCOUNTER — PHARMACY VISIT (OUTPATIENT)
Dept: PHARMACY | Facility: CLINIC | Age: 64
End: 2024-11-21
Payer: MEDICAID

## 2024-11-21 ENCOUNTER — APPOINTMENT (OUTPATIENT)
Dept: CARDIOLOGY | Facility: HOSPITAL | Age: 64
End: 2024-11-21
Payer: MEDICAID

## 2024-11-21 VITALS
OXYGEN SATURATION: 96 % | HEIGHT: 73 IN | RESPIRATION RATE: 16 BRPM | HEART RATE: 74 BPM | TEMPERATURE: 97.5 F | WEIGHT: 203.04 LBS | BODY MASS INDEX: 26.91 KG/M2 | DIASTOLIC BLOOD PRESSURE: 70 MMHG | SYSTOLIC BLOOD PRESSURE: 116 MMHG

## 2024-11-21 DIAGNOSIS — I25.118 ATHEROSCLEROTIC HEART DISEASE OF NATIVE CORONARY ARTERY WITH OTHER FORMS OF ANGINA PECTORIS: ICD-10-CM

## 2024-11-21 DIAGNOSIS — Z95.5 S/P DRUG ELUTING CORONARY STENT PLACEMENT: ICD-10-CM

## 2024-11-21 DIAGNOSIS — I25.110 CORONARY ARTERY DISEASE INVOLVING NATIVE CORONARY ARTERY OF NATIVE HEART WITH UNSTABLE ANGINA PECTORIS: ICD-10-CM

## 2024-11-21 DIAGNOSIS — R94.39 POSITIVE CARDIAC STRESS TEST: Primary | ICD-10-CM

## 2024-11-21 DIAGNOSIS — K21.9 GERD (GASTROESOPHAGEAL REFLUX DISEASE): ICD-10-CM

## 2024-11-21 DIAGNOSIS — I25.82 CHRONIC TOTAL OCCLUSION OF CORONARY ARTERY: ICD-10-CM

## 2024-11-21 DIAGNOSIS — R06.09 DYSPNEA ON EXERTION: ICD-10-CM

## 2024-11-21 LAB
ACT BLD: 347 SEC (ref 83–199)
ACT BLD: 377 SEC (ref 83–199)
ACT BLD: NORMAL S
ACT BLD: NORMAL S
ANION GAP SERPL CALC-SCNC: 9 MMOL/L (ref 10–20)
ATRIAL RATE: 71 BPM
BUN SERPL-MCNC: 13 MG/DL (ref 6–23)
CALCIUM SERPL-MCNC: 9.1 MG/DL (ref 8.6–10.3)
CHLORIDE SERPL-SCNC: 108 MMOL/L (ref 98–107)
CO2 SERPL-SCNC: 28 MMOL/L (ref 21–32)
CREAT SERPL-MCNC: 0.91 MG/DL (ref 0.5–1.3)
EGFRCR SERPLBLD CKD-EPI 2021: >90 ML/MIN/1.73M*2
ERYTHROCYTE [DISTWIDTH] IN BLOOD BY AUTOMATED COUNT: 13.4 % (ref 11.5–14.5)
GLUCOSE SERPL-MCNC: 89 MG/DL (ref 74–99)
HCT VFR BLD AUTO: 46.9 % (ref 41–52)
HGB BLD-MCNC: 15.5 G/DL (ref 13.5–17.5)
MCH RBC QN AUTO: 32.2 PG (ref 26–34)
MCHC RBC AUTO-ENTMCNC: 33 G/DL (ref 32–36)
MCV RBC AUTO: 97 FL (ref 80–100)
NRBC BLD-RTO: 0 /100 WBCS (ref 0–0)
P AXIS: 69 DEGREES
P OFFSET: 197 MS
P ONSET: 138 MS
PLATELET # BLD AUTO: 190 X10*3/UL (ref 150–450)
POTASSIUM SERPL-SCNC: 3.8 MMOL/L (ref 3.5–5.3)
PR INTERVAL: 146 MS
Q ONSET: 211 MS
QRS COUNT: 12 BEATS
QRS DURATION: 106 MS
QT INTERVAL: 408 MS
QTC CALCULATION(BAZETT): 443 MS
QTC FREDERICIA: 431 MS
R AXIS: 49 DEGREES
RBC # BLD AUTO: 4.82 X10*6/UL (ref 4.5–5.9)
SODIUM SERPL-SCNC: 141 MMOL/L (ref 136–145)
T AXIS: 26 DEGREES
T OFFSET: 415 MS
VENTRICULAR RATE: 71 BPM
WBC # BLD AUTO: 9.4 X10*3/UL (ref 4.4–11.3)

## 2024-11-21 PROCEDURE — G0269 OCCLUSIVE DEVICE IN VEIN ART: HCPCS | Mod: 59 | Performed by: INTERNAL MEDICINE

## 2024-11-21 PROCEDURE — 92978 ENDOLUMINL IVUS OCT C 1ST: CPT | Performed by: INTERNAL MEDICINE

## 2024-11-21 PROCEDURE — C1887 CATHETER, GUIDING: HCPCS | Performed by: INTERNAL MEDICINE

## 2024-11-21 PROCEDURE — 93010 ELECTROCARDIOGRAM REPORT: CPT | Performed by: INTERNAL MEDICINE

## 2024-11-21 PROCEDURE — 92944: CPT | Performed by: INTERNAL MEDICINE

## 2024-11-21 PROCEDURE — 2550000001 HC RX 255 CONTRASTS: Performed by: INTERNAL MEDICINE

## 2024-11-21 PROCEDURE — 2500000005 HC RX 250 GENERAL PHARMACY W/O HCPCS: Performed by: INTERNAL MEDICINE

## 2024-11-21 PROCEDURE — RXMED WILLOW AMBULATORY MEDICATION CHARGE

## 2024-11-21 PROCEDURE — 92944: CPT | Mod: RC | Performed by: INTERNAL MEDICINE

## 2024-11-21 PROCEDURE — 92943 PRQ TRLUML REVSC CH OCC ANT: CPT | Performed by: INTERNAL MEDICINE

## 2024-11-21 PROCEDURE — 99153 MOD SED SAME PHYS/QHP EA: CPT | Performed by: INTERNAL MEDICINE

## 2024-11-21 PROCEDURE — 2500000001 HC RX 250 WO HCPCS SELF ADMINISTERED DRUGS (ALT 637 FOR MEDICARE OP): Performed by: NURSE PRACTITIONER

## 2024-11-21 PROCEDURE — C9607 PERC D-E COR REVASC CHRO SIN: HCPCS | Mod: RC | Performed by: INTERNAL MEDICINE

## 2024-11-21 PROCEDURE — C1769 GUIDE WIRE: HCPCS | Performed by: INTERNAL MEDICINE

## 2024-11-21 PROCEDURE — 80051 ELECTROLYTE PANEL: CPT | Performed by: NURSE PRACTITIONER

## 2024-11-21 PROCEDURE — 96373 THER/PROPH/DIAG INJ IA: CPT | Performed by: INTERNAL MEDICINE

## 2024-11-21 PROCEDURE — 85027 COMPLETE CBC AUTOMATED: CPT | Performed by: NURSE PRACTITIONER

## 2024-11-21 PROCEDURE — 36415 COLL VENOUS BLD VENIPUNCTURE: CPT | Performed by: NURSE PRACTITIONER

## 2024-11-21 PROCEDURE — C1874 STENT, COATED/COV W/DEL SYS: HCPCS | Performed by: INTERNAL MEDICINE

## 2024-11-21 PROCEDURE — 76937 US GUIDE VASCULAR ACCESS: CPT | Performed by: INTERNAL MEDICINE

## 2024-11-21 PROCEDURE — 99152 MOD SED SAME PHYS/QHP 5/>YRS: CPT | Performed by: INTERNAL MEDICINE

## 2024-11-21 PROCEDURE — C1760 CLOSURE DEV, VASC: HCPCS | Performed by: INTERNAL MEDICINE

## 2024-11-21 PROCEDURE — 2500000004 HC RX 250 GENERAL PHARMACY W/ HCPCS (ALT 636 FOR OP/ED): Performed by: INTERNAL MEDICINE

## 2024-11-21 PROCEDURE — 92978 ENDOLUMINL IVUS OCT C 1ST: CPT | Mod: RC | Performed by: INTERNAL MEDICINE

## 2024-11-21 PROCEDURE — C1753 CATH, INTRAVAS ULTRASOUND: HCPCS | Performed by: INTERNAL MEDICINE

## 2024-11-21 PROCEDURE — 2780000003 HC OR 278 NO HCPCS: Performed by: INTERNAL MEDICINE

## 2024-11-21 PROCEDURE — C1725 CATH, TRANSLUMIN NON-LASER: HCPCS | Performed by: INTERNAL MEDICINE

## 2024-11-21 PROCEDURE — C1894 INTRO/SHEATH, NON-LASER: HCPCS | Performed by: INTERNAL MEDICINE

## 2024-11-21 PROCEDURE — 7100000009 HC PHASE TWO TIME - INITIAL BASE CHARGE: Performed by: INTERNAL MEDICINE

## 2024-11-21 PROCEDURE — 2720000007 HC OR 272 NO HCPCS: Performed by: INTERNAL MEDICINE

## 2024-11-21 PROCEDURE — 7100000010 HC PHASE TWO TIME - EACH INCREMENTAL 1 MINUTE: Performed by: INTERNAL MEDICINE

## 2024-11-21 PROCEDURE — 99223 1ST HOSP IP/OBS HIGH 75: CPT | Performed by: NURSE PRACTITIONER

## 2024-11-21 PROCEDURE — 93005 ELECTROCARDIOGRAM TRACING: CPT

## 2024-11-21 PROCEDURE — 85347 COAGULATION TIME ACTIVATED: CPT | Mod: 91

## 2024-11-21 DEVICE — STENT ONYXNG22538UX ONYX 2.25X38RX
Type: IMPLANTABLE DEVICE | Site: HEART | Status: FUNCTIONAL
Brand: ONYX FRONTIER™

## 2024-11-21 DEVICE — EVEROLIMUS-ELUTING PLATINUM CHROMIUM CORONARY STENT SYSTEM
Type: IMPLANTABLE DEVICE | Site: HEART | Status: FUNCTIONAL
Brand: SYNERGY™ XD

## 2024-11-21 RX ORDER — PANTOPRAZOLE SODIUM 40 MG/1
40 TABLET, DELAYED RELEASE ORAL
Qty: 90 TABLET | Refills: 0 | Status: SHIPPED | OUTPATIENT
Start: 2024-11-21

## 2024-11-21 RX ORDER — NITROGLYCERIN 40 MG/100ML
INJECTION INTRAVENOUS AS NEEDED
Status: DISCONTINUED | OUTPATIENT
Start: 2024-11-21 | End: 2024-11-21 | Stop reason: HOSPADM

## 2024-11-21 RX ORDER — ATORVASTATIN CALCIUM 80 MG/1
80 TABLET, FILM COATED ORAL DAILY
Qty: 90 TABLET | Refills: 0 | Status: SHIPPED | OUTPATIENT
Start: 2024-11-21

## 2024-11-21 RX ORDER — NAPROXEN SODIUM 220 MG/1
81 TABLET, FILM COATED ORAL ONCE
Status: COMPLETED | OUTPATIENT
Start: 2024-11-21 | End: 2024-11-21

## 2024-11-21 RX ORDER — VERAPAMIL HYDROCHLORIDE 2.5 MG/ML
INJECTION, SOLUTION INTRAVENOUS AS NEEDED
Status: DISCONTINUED | OUTPATIENT
Start: 2024-11-21 | End: 2024-11-21 | Stop reason: HOSPADM

## 2024-11-21 RX ORDER — HEPARIN SODIUM 1000 [USP'U]/ML
INJECTION, SOLUTION INTRAVENOUS; SUBCUTANEOUS AS NEEDED
Status: DISCONTINUED | OUTPATIENT
Start: 2024-11-21 | End: 2024-11-21 | Stop reason: HOSPADM

## 2024-11-21 RX ORDER — LIDOCAINE HYDROCHLORIDE 10 MG/ML
INJECTION, SOLUTION EPIDURAL; INFILTRATION; INTRACAUDAL; PERINEURAL AS NEEDED
Status: DISCONTINUED | OUTPATIENT
Start: 2024-11-21 | End: 2024-11-21 | Stop reason: HOSPADM

## 2024-11-21 RX ORDER — ACETAMINOPHEN 325 MG/1
650 TABLET ORAL EVERY 6 HOURS PRN
Status: DISCONTINUED | OUTPATIENT
Start: 2024-11-21 | End: 2024-11-21 | Stop reason: HOSPADM

## 2024-11-21 RX ORDER — ACETAMINOPHEN 160 MG/5ML
650 SOLUTION ORAL EVERY 6 HOURS PRN
Status: DISCONTINUED | OUTPATIENT
Start: 2024-11-21 | End: 2024-11-21 | Stop reason: HOSPADM

## 2024-11-21 RX ORDER — ACETAMINOPHEN 650 MG/1
650 SUPPOSITORY RECTAL EVERY 6 HOURS PRN
Status: DISCONTINUED | OUTPATIENT
Start: 2024-11-21 | End: 2024-11-21 | Stop reason: HOSPADM

## 2024-11-21 RX ORDER — MIDAZOLAM HYDROCHLORIDE 1 MG/ML
INJECTION, SOLUTION INTRAMUSCULAR; INTRAVENOUS AS NEEDED
Status: DISCONTINUED | OUTPATIENT
Start: 2024-11-21 | End: 2024-11-21 | Stop reason: HOSPADM

## 2024-11-21 RX ORDER — FENTANYL CITRATE 50 UG/ML
INJECTION, SOLUTION INTRAMUSCULAR; INTRAVENOUS AS NEEDED
Status: DISCONTINUED | OUTPATIENT
Start: 2024-11-21 | End: 2024-11-21 | Stop reason: HOSPADM

## 2024-11-21 RX ORDER — CLOPIDOGREL BISULFATE 300 MG/1
600 TABLET, FILM COATED ORAL ONCE
Status: COMPLETED | OUTPATIENT
Start: 2024-11-21 | End: 2024-11-21

## 2024-11-21 RX ORDER — CLOPIDOGREL BISULFATE 75 MG/1
75 TABLET ORAL DAILY
Qty: 90 TABLET | Refills: 0 | Status: SHIPPED | OUTPATIENT
Start: 2024-11-21

## 2024-11-21 ASSESSMENT — PAIN SCALES - GENERAL
PAINLEVEL_OUTOF10: 6
PAINLEVEL_OUTOF10: 0 - NO PAIN

## 2024-11-21 ASSESSMENT — ENCOUNTER SYMPTOMS
HEMATOLOGIC/LYMPHATIC NEGATIVE: 1
CARDIOVASCULAR NEGATIVE: 1
RESPIRATORY NEGATIVE: 1
ALLERGIC/IMMUNOLOGIC NEGATIVE: 1
CONSTITUTIONAL NEGATIVE: 1
EYES NEGATIVE: 1
NEUROLOGICAL NEGATIVE: 1
MUSCULOSKELETAL NEGATIVE: 1
ENDOCRINE NEGATIVE: 1
PSYCHIATRIC NEGATIVE: 1
GASTROINTESTINAL NEGATIVE: 1

## 2024-11-21 NOTE — H&P
History Of Present Illness  Saji Gordon is a 64 y.o. male presenting with abnormal stress test, known  RCA, here for planned PCI. PMH includes HTN, COPD, JERAD, current smoker, lumbar radiculopathy, HLD.    Past Medical History:  Past Medical History:   Diagnosis Date    Frequency of urination 08/14/2023        Past Surgical History:  Past Surgical History:   Procedure Laterality Date    CARDIAC CATHETERIZATION N/A 10/8/2024    Procedure: Left Heart Cath with Coronary Angiography and LV;  Surgeon: Carl B Gillombardo, MD;  Location: [facility] Cardiac Cath Lab;  Service: Cardiovascular;  Laterality: N/A;  [facility] OCT 8TH, 2024 AT 11:00 AM PT WILL ARRIVE AT 9:30 AM @ [facility] - DR. GILLOMBARDO    OTHER SURGICAL HISTORY  10/28/2020    No history of surgery          Social History:   reports that he has been smoking cigarettes. He has a 40 pack-year smoking history. He has been exposed to tobacco smoke. He has never used smokeless tobacco. He reports that he does not drink alcohol and does not use drugs.     Family History:  Family History   Problem Relation Name Age of Onset    Other (CARDIAC DISORDER) Other MULTIPLE FAMNILY MEMBERS         Allergies:  No Known Allergies     Home Medications:  Current Outpatient Medications   Medication Instructions    allopurinol (ZYLOPRIM) 300 mg, oral, Daily, as directed    aspirin 81 mg EC tablet 1 tablet, Daily    atorvastatin (LIPITOR) 40 mg, Daily    cyclobenzaprine (FLEXERIL) 10 mg, oral, 3 times daily PRN    finasteride (Proscar) 5 mg tablet 1 tablet, Daily    lisinopril 5 mg, oral, Daily    metoprolol succinate XL (Toprol-XL) 25 mg 24 hr tablet     nicotine polacrilex (COMMIT) 4 mg, Mouth/Throat, Every 2 hour PRN    omeprazole (PRILOSEC) 40 mg, Daily    tamsulosin (FLOMAX) 0.8 mg, Daily    temazepam (RESTORIL) 30 mg, oral, Nightly PRN    traZODone (DESYREL) 150 mg, oral, Nightly       Inpatient Medications:  Scheduled medications   Medication Dose Route Frequency     PRN  "medications   Medication    fentaNYL PF    lidocaine PF    midazolam     Continuous Medications   Medication Dose Last Rate         Review of Systems   Constitutional: Negative.    HENT: Negative.     Eyes: Negative.    Respiratory: Negative.     Cardiovascular: Negative.    Gastrointestinal: Negative.    Endocrine: Negative.    Genitourinary: Negative.    Musculoskeletal: Negative.    Skin: Negative.    Allergic/Immunologic: Negative.    Neurological: Negative.    Hematological: Negative.    Psychiatric/Behavioral: Negative.            Physical Exam  Constitutional:       General: He is awake. He is not in acute distress.     Appearance: He is not ill-appearing.   Cardiovascular:      Rate and Rhythm: Normal rate and regular rhythm.      Pulses:           Radial pulses are 2+ on the right side and 2+ on the left side.        Dorsalis pedis pulses are 1+ on the right side and 1+ on the left side.      Heart sounds: Normal heart sounds. No murmur heard.  Pulmonary:      Effort: Pulmonary effort is normal.      Breath sounds: Normal breath sounds and air entry.   Abdominal:      General: Bowel sounds are normal.      Palpations: Abdomen is soft.      Tenderness: There is no abdominal tenderness.   Musculoskeletal:      Right lower leg: No edema.      Left lower leg: No edema.   Skin:     General: Skin is warm and dry.   Neurological:      General: No focal deficit present.      Mental Status: He is alert and oriented to person, place, and time.      GCS: GCS eye subscore is 4. GCS verbal subscore is 5. GCS motor subscore is 6.   Psychiatric:         Mood and Affect: Mood normal.         Behavior: Behavior is cooperative.        Sedation Plan    ASA 3     Mallampati class: III.           NPO since last night around 2100    Last Recorded Vitals  Blood pressure 114/75, pulse 77, temperature 36.4 °C (97.5 °F), temperature source Temporal, resp. rate 16, height 1.854 m (6' 1\"), weight 92.1 kg (203 lb 0.7 oz), SpO2 97%.     " "    Vitals from the Past 24 Hours  Heart Rate:  [77]   Temp:  [36.4 °C (97.5 °F)]   Resp:  [16]   BP: (114)/(75)   Height:  [185.4 cm (6' 1\")]   Weight:  [92.1 kg (203 lb 0.7 oz)]   SpO2:  [97 %]          Relevant Results    Labs    CBC:   Recent Labs     11/21/24  1040 10/08/24  0959 08/24/23  1643 03/21/23  1500 05/16/22  1203   WBC 9.4 9.8 12.0* 13.2* 11.8*   HGB 15.5 15.3 16.4 15.7 15.7   HCT 46.9 45.9 48.7 45.4 46.8    182 202 181 204   MCV 97 99 98 94 97     BMP/CMP:   Recent Labs     11/21/24  1040 10/08/24  0959 08/24/23  1643 03/21/23  1500 05/16/22  1203    140 134* 136 142   K 3.8 3.9 4.6 4.4 3.8   * 106 100 103 105   BUN 13 13 13 9 16   CREATININE 0.91 0.99 1.19 1.00 0.99   CO2 28 27 30 29 29   CALCIUM 9.1 8.7 9.5 9.5 9.1   PROT  --   --  7.5 7.2 6.8   BILITOT  --   --  0.7 0.8 0.6   ALKPHOS  --   --  91 95 96   ALT  --   --  11 13 19   AST  --   --  12 12 15   GLUCOSE 89 68* 81 87 74      Magnesium: No results for input(s): \"MG\" in the last 12732 hours.  Lipid Panel:   Recent Labs     03/21/23  1500 05/16/22  1203   CHOL 93 107   HDL 30.8* 34.0*   CHHDL 3.0 3.1   VLDL 32 29   TRIG 159* 146     Cardiac       No lab exists for component: \"CK\", \"CKMBP\"   Hemoglobin A1C: No results for input(s): \"HGBA1C\" in the last 50445 hours.  TSH/ Free T4: No results for input(s): \"TSH\", \"FREET4\" in the last 43015 hours.  Iron: No results for input(s): \"FERRITIN\", \"TIBC\", \"IRONSAT\", \"BNP\" in the last 52745 hours.  Coag:     ABO: No results found for: \"ABO\"    Past Cardiology Tests (Last 3 Years):    EKG:  Recent Labs     10/02/24  1530   ATRRATE 78   VENTRATE 78   PRINT 136   QRSDUR 100   QTCFRED 423   QTCCALCB 442     Encounter Date: 10/02/24   ECG 12 lead (Clinic Performed)   Result Value    Ventricular Rate 78    Atrial Rate 78    OH Interval 136    QRS Duration 100    QT Interval 388    QTC Calculation(Bazett) 442    P Axis 26    R Axis 18    T Axis 19    QRS Count 13    Q Onset 214    P Onset 146 " "   P Offset 199    T Offset 408    QTC Fredericia 423    Narrative    Sinus rhythm with occasional Premature ventricular complexes and Premature atrial complexes  Nonspecific T wave abnormality  Abnormal ECG  No previous ECGs available  Confirmed by Rubio Bennett ([id number]) on 10/7/2024 10:39:53 AM     Echo:  Echocardiogram: No results found for this or any previous visit from the past 1800 days.    Ejection Fractions:  No results found for: \"EF\"  Cath:  Coronary Angiography:   Left Heart Cath with Coronary Angiography and LV 10/08/2024    Study:            Left Heart Cath  Additional Study: Coronary Arteriogram      Indications:  RUBIO NARAYAN is a 64 year old male who presents with dyslipidemia, hypertension, obesity, chronic pulmonary disease, coronary artery disease and a chest pain assessment of typical angina. New onset angina <=2 months.    Appropriate Use Criteria:  Intermediate risk findings with symptoms (5-10% ischemic myocardium on stress SPECT or PET, stress-induced 1 seg WMA on SE or CMR); AUC score = 7.    Procedure Description:  After infiltration with 1% Lidocaine, the right radial artery was cannulated with a modified Seldinger technique. Subsequently a 6 Chinese sheath was placed retrograde in the right radial artery. Selective coronary catheterization was performed using a 5 Fr catheter(s) exchanged over a guide wire to cannulate the coronary arteries. A JL 4 tip catheter was used for left coronary injections. A JR 4 tip catheter was used for right coronary injections.  Multiple injections of contrast were made into the left and right coronary arteries with angiograms recorded in multiple projections. After completion of the procedure, the arterial sheath was pulled and a TR Band Radial Compression Device was utilized to obtain patent hemostasis.    Coronary Angiography:  The coronary circulation is right dominant.    Left Main Coronary Artery:  The left main coronary artery is a normal " caliber vessel. The left main arises normally from the left coronary sinus of Valsalva and bifurcates into the LAD and circumflex coronary arteries. The left main coronary artery showed mild atherosclerotic disease. The entire left main coronary artery showed <10% stenosis.    Left Anterior Descending Coronary Artery Distribution:  The left anterior descending coronary artery is a normal caliber vessel. The LAD arises normally from the left main coronary artery. The LAD demonstrated mild atherosclerotic disease. The entire left anterior descending coronary artery showed 10 to 30% stenosis. The 1st diagonal branch showed no significant disease or stenosis greater than 30%. The 2nd diagonal branch demonstrated no significant disease or stenosis greater than 30%. The 3rd diagonal branch revealed no significant disease or stenosis greater than 30%.    Circumflex Coronary Artery Distribution:  The circumflex coronary artery is a normal caliber vessel. The circumflex arises normally from the left main coronary artery and terminates in the AV groove. The circumflex revealed mild atherosclerotic disease. The entire circumflex coronary artery showed 10 to 30% stenosis. The 1st obtuse marginal branch showed no significant disease or stenosis greater than 30%. The 2nd obtuse marginal branch demonstrated no significant disease or stenosis greater than 30%. The 3rd obtuse marginal branch revealed no significant disease or stenosis greater than 30%.    Right Coronary Artery Distribution:    The right coronary artery is a normal caliber vessel. The RCA arises normally from the right sinus of Valsalva. The RCA showed severe atherosclerotic disease and chronic occlusion. The mid right coronary artery showed 100% stenosis. The acute marginal branch showed no significant disease or stenosis greater than 30%.  The right posterolateral branch showed no significant disease or stenosis greater than 30%. The right posterior descending  artery showed no significant disease or stenosis greater than 30%.    Coronary Lesion Summary:  Vessel          Stenosis      Vessel Segment  Left Main    <10% stenosis        entire  LAD        10 to 30% stenosis     entire  Circumflex 10 to 30% stenosis     entire  RCA          100% stenosis         mid      Complications:  No in-lab complications observed.    Cardiac Cath Post Procedure Notes:  Post Procedure Diagnosis: Right dominant coronary circulation with severe single  vessel CAD  RCA .  Blood Loss:               Estimated blood loss during the procedure was 10 mls.  Specimens Removed:        Number of specimen(s) removed: none.      Recommendations:  Maximize medical therapy.  Agressive risk factor modification efforts.  Patient counseled and advised to discontinue smoking.  Medical management of coronary artery disease.    ____________________________________________________________________________________  CONCLUSIONS:  1. Right dominant coronary circulation with severe single-vessel CAD.  2. RCA  in the mid segment.  3. LVEDP 18 mmHg.      Right Heart Cath: No results found for this or any previous visit from the past 1800 days.    Stress Test:  Nuclear:No results found for this or any previous visit from the past 1800 days.    Metabolic Stress: No results found for this or any previous visit from the past 1800 days.    Cardiac Imaging:  Cardiac Scoring:   CT HEART CALCIUM SCORING WO IV CONTRAST 04/13/2021    FINDINGS:  The score and distribution of calcium in the coronary arteries is as  follows:    LM            0  LAD           112.98  LCx           0  RCA           250.85    Total          363.83      The heart size is normal and there is no pericardial effusion. There  is mild dilatation of the ascending aorta measuring approximately 3.8  cm in the transaxial plane.    The 5 mm right middle lobe nodule is present on image 29 of series  203. This is probably an intrapulmonary node, However  follow-up  within 1 year would be recommended if there are risk factors for  malignancy. The lungs otherwise are clear. the visualized upper  abdominal contents are within normal limits.  The bony structures are intact.    Impression  Coronary artery calcium score of  363.83.  Mild dilatation of the ascending aorta.  Right middle lobe nodule as described..      Cardiac MRI: No results found for this or any previous visit from the past 1800 days.         Assessment/Plan  Assessment/Plan   Principal Problem:    Chronic total occlusion of coronary artery  Active Problems:    Positive cardiac stress test    Dyspnea on exertion    Coronary artery disease involving native coronary artery of native heart with unstable angina pectoris      abnormal stress test, known  RCA  -planned PCI with Drs. Poommipanit and Gillombardo on 11/21/24  -asa prior to procedure  -plavix 600mg once prior to procedure       NP discussed with Drs. Poommipanit and Gillombardo regarding plan of care/ discharge plan      I spent 30 minutes in the professional and overall care of this patient.      Derick Davila, APRN-CNP, DNP

## 2024-11-21 NOTE — Clinical Note
Angioplasty of the right coronary artery lesion. Inflation 1: Pressure = 16 siomara; Duration = 20 sec. Inflation 2: Pressure = 22 siomara; Duration = 20 sec. Inflation 3: Pressure = 22 siomara; Duration = 17 sec.

## 2024-11-21 NOTE — Clinical Note
Closure device placed in the right radial artery. Site closed by radial compression system. TR Band 14cc's

## 2024-11-21 NOTE — Clinical Note
Inflation 1: Pressure = 10 siomara; Duration = 10 sec. Inflation 2: Pressure = 10 siomara; Duration = 10 sec. Inflation 3: Pressure = 14 siomara; Duration = 15 sec. Inflation 4: Pressure = 14 siomara; Duration = 20 sec.

## 2024-11-21 NOTE — Clinical Note
Angioplasty of the right coronary artery lesion. Inflation 1: Pressure = 10 siomara; Duration = 15 sec. Inflation 2: Pressure = 10 siomara; Duration = 10 sec.

## 2024-11-21 NOTE — Clinical Note
Angioplasty of the right coronary artery lesion. Inflation 1: Pressure = 20 siomara; Duration = 20 sec. Inflation 2: Pressure = 20 siomara; Duration = 20 sec. Inflation 3: Pressure = 20 siomara; Duration = 25 sec. Inflation 4: Pressure = 20 siomara; Duration = 20 sec.

## 2024-11-21 NOTE — DISCHARGE INSTRUCTIONS
Please keep hydrated, especially over the next 24 hours (after your procedure). The goal is for you to have a total of 72-96 ounces water/gatorade intake for the next 24 hours. This will help flush the dye from the procedure through your kidneys and prevent kidney injury.     Please start plavix tomorrow. Your omeprazole (prilosec) has been switched to pantoprazole (protonix), a similar medication to omeprazole. The reason for the switch is that omeprazole (prilosec) can decrease the effectiveness of clopidogrel (plavix).             CARDIAC CATHETERIZATION DISCHARGE INSTRUCTIONS     FOR SUDDEN AND SEVERE CHEST PAIN, SHORTNESS OF BREATH, EXCESSIVE BLEEDING, SIGNS OF STROKE, OR CHANGES IN MENTAL STATUS YOU SHOULD CALL 911 IMMEDIATELY.     If your provider has prescribed aspirin and/or clopidogrel (Plavix), or prasugrel (Effient), or ticagrelor (Brilinta), DO NOT STOP THESE MEDICATIONS for any reason without talking to your cardiologist first. If any of these were prescribed, you must take them every day without missing a single dose. If you are getting low on these medications, contact your provider immediately for a refill.     FOR NEXT 24 HOURS  - Upon discharge, you should return home and rest for the remainder of the day and evening. You do not have to stay on bed rest but should not be very active.  It is recommended a responsible adult be with you for the first 24 hours after the procedure.    - No driving for 24 hours after procedure. Please arrange for someone to drive you home from the hospital today.     - Do not drive, operate machinery, or use power tools for 24 hours after your procedure.     - Do not make any legal decisions for 24 hours after your procedure.     - Do not drink alcoholic beverages for 24 hours after your procedure.    WOUND CARE   *FOR FEMORAL (LEG) ACCESS*  ·      Avoid heavy lifting (over 10 pounds) for 7 days, squatting or excessive bending for 2 days, and strenuous exercise for 7  days.  ·      No submerged bathing, swimming, or hot tubs for the next 7 days, or until fully healed.  ·      Avoid sexual activity for 3-4 days until any groin discomfort has ceased.     *FOR RADIAL (WRIST) ACCESS*  ·      No lifting more than 5 pounds or excessive use of the wrist for 24 hours - for example, treat your wrist as if it is sprained.  ·      Do not engage in vigorous activities (tennis, golf, bowling, weights) for at least 48 hours after the procedure.  ·      Do not submerge the wrist for 7 days after the procedure.  ·      You should expect mild tingling in your hand and tenderness at the puncture site for up to 3 days.    - The transparent dressing should be removed from the site 24 hours after the procedure.  Wash the site gently with soap and water. Rinse well and pat dry. Keep the area clean and dry. You may apply a Band-Aid to the site. Avoid lotions, ointments, or powders until fully healed.     - You may shower the day after your procedure.      - It is normal to notice a small bruise around the puncture site and/or a small grape sized or smaller lump. Any large bruising or large lump warrants a call to the office.     - If bleeding should occur, lay down and apply pressure to the affected area for 10 minutes.  If the bleeding stops notify your physician.  If there is a large amount of bleeding or spurting of blood CALL 911 immediately.  DO NOT drive yourself to the hospital.    - You may experience some tenderness, bruising or minimal inflammation.  If you have any concerns, you may contact the Cath Lab or if any of these symptoms become excessive, contact your cardiologist or go to the emergency room.     OTHER INSTRUCTIONS  - You may take acetaminophen (Tylenol) as directed for discomfort.  If pain is not relieved with acetaminophen (Tylenol), contact your doctor.    - If you notice or experience any of the following, you should notify your doctor or seek medical attention  Chest pain or  discomfort  Change in mental status or weakness in extremities.  Dizziness, light headedness, or feeling faint.  Change in the site where the procedure was performed, such as bleeding or an increased area of bruising or swelling.  Tingling, numbness, pain, or coolness in the leg/arm beyond the site where the procedure was performed.  Signs of infection (i.e. shaking chills, temperature > 100 degrees Fahrenheit, warmth, redness) in the leg/arm area where the procedure was performed.  Changes in urination   Bloody or black stools  Vomiting blood  Severe nose bleeds  Any excessive bleeding    - If you DO NOT have an appointment with your cardiologist within 2-4 weeks following your procedure, please contact their office.

## 2024-11-21 NOTE — NURSING NOTE
Home going instructions specific to procedure given. Easily arousable; responding appropriately. Vs +/- 20% of pre procedure status. Significant complications are absent. Ambulates without dizziness/age appropriate activity, pulse ox above or equal to 92% on room air/ordered oxygen treatment. Care Plan Complete. Discharge to home accompanied by spouse and  for family. Discharged via wheelchair.

## 2024-11-21 NOTE — POST-PROCEDURE NOTE
Physician Transition of Care Summary  Invasive Cardiovascular Lab    Procedure Date: 11/21/2024  Attending:    * Shady Bhatia - Primary  Resident/Fellow/Other Assistant: Surgeons and Role:     * Carl B Gillombardo, MD - Assisting    Indications:   Pre-op Diagnosis      * Positive cardiac stress test [R94.39]     * Dyspnea on exertion [R06.09]     * Coronary artery disease involving native coronary artery of native heart with unstable angina pectoris [I25.110]     * Chronic total occlusion of coronary artery [I25.82]    Post-procedure diagnosis:   Post-op Diagnosis     * Positive cardiac stress test [R94.39]     * Dyspnea on exertion [R06.09]     * Coronary artery disease involving native coronary artery of native heart with unstable angina pectoris [I25.110]     * Chronic total occlusion of coronary artery [I25.82]    Procedure(s):   PCI TRISTEN Stent- Coronary (PCI  RCA)  70626 - VT PRQ TRLUML CORONRY CHRONIC OCCLUS REVASC ONE VSL        Procedure Findings:   RCA      Description of the Procedure:   - IVUS guide  PCI to the RCA utlizing antergrade wiring with two TRISTEN from RPLV to proximal RCA.   - Balloon angioplasty to RPDA   - Dual angiography vi 7Fr right radial and 6Fr right common femoral access   - RCFA closure with 6Fr Angioseal     Complications:   None     Stents/Implants:   Implants       Stent    Stent, Yolande Houston Tristen, 2.25 X 38rx - [id number] - Implanted        Inventory item: STENT, YOLANDE FRONTIER TRISTEN, 2.25 X 38RX Model/Cat number: [id number]    : [employer] Lot number: [id number]    Device identifier: [id number]        As of 11/21/2024       Status: Implanted                      Stent, Synergy Xd, Mr Us, 3.00 X 48mm - [id number] - Implanted        Inventory item: STENT, SYNERGY XD, MR US, 3.00 X 48MM Model/Cat number: [id number]    : [employer] Lot number: [id number]    Device identifier: [id number]        As of 11/21/2024       Status:  Implanted                              Anticoagulation/Antiplatelet Plan:   Uninterrupted DAPT for at least 12 mos    Estimated Blood Loss:   15 mL    Anesthesia: Moderate Sedation Anesthesia Staff: No anesthesia staff entered.    Any Specimen(s) Removed:   Order Name Source Comment Collection Info Order Time   CBC Blood, Venous  Collected By: Samuel Leon RN 11/21/2024 10:39 AM     Release result to [employer]   Immediate        BASIC METABOLIC PANEL Blood, Venous  Collected By: Samuel Leon RN 11/21/2024 10:39 AM     Release result to [employer]   Immediate            Disposition:   To home later today after bedrest if access sites are stable.       Electronically signed by: Luis Worley MD, 11/21/2024 2:12 PM

## 2024-11-21 NOTE — Clinical Note
Inflation 1: Pressure = 10 siomara; Duration = 20 sec. Inflation 2: Pressure = 12 siomara; Duration = 12 sec. Inflation 3: Pressure = 16 siomara; Duration = 16 sec.

## 2024-11-21 NOTE — Clinical Note
Inflation 1: Pressure = 6 siomara; Duration = 9 sec. Inflation 2: Pressure = 12 siomara; Duration = 4 sec. Inflation 3: Pressure = 12 siomara; Duration = 8 sec. Inflation 4: Pressure = 12 siomara; Duration = 6 sec.

## 2024-11-27 LAB
ATRIAL RATE: 71 BPM
P AXIS: 69 DEGREES
P OFFSET: 197 MS
P ONSET: 138 MS
PR INTERVAL: 146 MS
Q ONSET: 211 MS
QRS COUNT: 12 BEATS
QRS DURATION: 106 MS
QT INTERVAL: 408 MS
QTC CALCULATION(BAZETT): 443 MS
QTC FREDERICIA: 431 MS
R AXIS: 49 DEGREES
T AXIS: 26 DEGREES
T OFFSET: 415 MS
VENTRICULAR RATE: 71 BPM

## 2024-12-02 ENCOUNTER — APPOINTMENT (OUTPATIENT)
Dept: CARDIAC REHAB | Facility: HOSPITAL | Age: 64
End: 2024-12-02
Payer: MEDICAID

## 2024-12-04 ENCOUNTER — APPOINTMENT (OUTPATIENT)
Dept: CARDIOLOGY | Facility: HOSPITAL | Age: 64
End: 2024-12-04
Payer: MEDICAID

## 2024-12-04 DIAGNOSIS — Z98.890 STATUS POST LEFT HEART CATHETERIZATION: Primary | ICD-10-CM

## 2024-12-04 NOTE — PROGRESS NOTES
Referred by Dr. Marrero ref. provider found for No chief complaint on file.     History Of Present Illness:    Saji Gordon is a 64 y.o. male with complex PMH (detailed below) presenting to outpatient cardiology clinic ***.    Past Medical History:  ***    Review of Systems   Constitutional:  No Weight Change, No Fever, No Chills, No Night Sweats, No Fatigue, No Malaise   ENT/Mouth:  No Hearing Changes, No Ear Pain, No Nasal Congestion, No  Sinus Pain, No Hoarseness, No sore throat, No Rhinorrhea, No Swallowing  Difficulty   Eyes:  No Eye Pain, No Swelling, No Redness, No Foreign Body, No Discharge, No Vision Changes   Cardiovascular:  See HPI   Respiratory:  No Cough, No Sputum, No Wheezing, No Smoke Exposure, No Dyspnea   Gastrointestinal:  No Nausea, No Vomiting, No Diarrhea, No  Constipation, No Pain, No Heartburn, No Anorexia, No Dysphagia, No  Hematochezia, No Melena, No Flatulence, No Jaundice   Genitourinary:  No Dysmenorrhea, No DUB, No Dyspareunia, No Dysuria, No  Urinary Frequency, No Hematuria, No Urinary Incontinence, No Urgency,  No Flank Pain, No Urinary Flow Changes   Musculoskeletal:  No Arthralgias, No Myalgias, No Joint Swelling, No  Joint Stiffness, No Back Pain, No Neck Pain, No Injury History   Skin:  No Skin Lesions, No Pruritis, No Hair Changes, No Breast/Skin Changes, No Nipple Discharge   Neuro:  No Weakness, No Numbness, No Paresthesias, No Loss of  Consciousness, No Syncope, No Dizziness, No Headache, No Coordination  Changes, No Recent Falls   Psych:  No Anxiety/Panic, No Depression, No Insomnia, No Delusions, No Rumination, No SI/HI/AH/VH, No Social Issues,  No Memory Changes, No Violence/Abuse Hx., No Eating Concerns   Heme/Lymph:  No Bruising, No Bleeding, No Transfusions History, No Lymphadenopathy   Endocrine:  No Polyuria, No Polydipsia, No Temperature Intolerance        Past Medical History:  He has a past medical history of Frequency of urination (08/14/2023).    Past Surgical  History:  He has a past surgical history that includes Other surgical history (10/28/2020); Cardiac catheterization (N/A, 10/8/2024); and Cardiac catheterization (N/A, 11/21/2024).      Social History:  He reports that he has been smoking cigarettes. He has a 40 pack-year smoking history. He has been exposed to tobacco smoke. He has never used smokeless tobacco. He reports that he does not drink alcohol and does not use drugs.    Family History:  Family History   Problem Relation Name Age of Onset    Other (CARDIAC DISORDER) Other MULTIPLE FAMNILY MEMBERS         Allergies:  Patient has no known allergies.    Outpatient Medications:  Current Outpatient Medications   Medication Instructions    allopurinol (ZYLOPRIM) 300 mg, oral, Daily, as directed    aspirin 81 mg EC tablet 1 tablet, Daily    atorvastatin (LIPITOR) 80 mg, oral, Daily    clopidogrel (PLAVIX) 75 mg, oral, Daily    cyclobenzaprine (FLEXERIL) 10 mg, oral, 3 times daily PRN    finasteride (Proscar) 5 mg tablet 1 tablet, Daily    lisinopril 5 mg, oral, Daily    metoprolol succinate XL (Toprol-XL) 25 mg 24 hr tablet     nicotine polacrilex (COMMIT) 4 mg, Mouth/Throat, Every 2 hour PRN    pantoprazole (PROTONIX) 40 mg, oral, Daily before breakfast, Do not crush, chew, or split.    tamsulosin (FLOMAX) 0.8 mg, Daily    temazepam (RESTORIL) 30 mg, oral, Nightly PRN    traZODone (DESYREL) 150 mg, oral, Nightly        Last Recorded Vitals:  There were no vitals filed for this visit.    Physical Exam:  ***  Physical exam  GEN: calm, cooperative, asking appropriate questions  NEURO: Alert and oriented x3, CN2-12 intact, SILT, Moving all extremities without difficulty  HEENT: atraumatic, no goiter, no JVD, normal uvula   CARDS: PMI is non-displaced, RRR, no MRG  PULM: CTAB, no wheezes / rales / rhonchi   ABD: soft, non-distended, non-tender, non-tympanitic, BS in all 4 quadrants. No hepatosplenomegaly  : unremarkable  SKIN: healthy appearing, normal skin turgor  "  EXT: atraumatic  VASC: b/l radial pulses are brisk and equal            Last Labs:  CBC -  Lab Results   Component Value Date    WBC 9.4 11/21/2024    HGB 15.5 11/21/2024    HCT 46.9 11/21/2024    MCV 97 11/21/2024     11/21/2024       CMP -  Lab Results   Component Value Date    CALCIUM 9.1 11/21/2024    PROT 7.5 08/24/2023    ALBUMIN 4.2 08/24/2023    AST 12 08/24/2023    ALT 11 08/24/2023    ALKPHOS 91 08/24/2023    BILITOT 0.7 08/24/2023       LIPID PANEL -   Lab Results   Component Value Date    CHOL 93 03/21/2023    HDL 30.8 (A) 03/21/2023    CHHDL 3.0 03/21/2023    VLDL 32 03/21/2023    TRIG 159 (H) 03/21/2023       RENAL FUNCTION PANEL -   Lab Results   Component Value Date    K 3.8 11/21/2024       No results found for: \"BNP\", \"HGBA1C\"        Last Cardiology Tests:    ECG:  Encounter Date: 11/21/24   Electrocardiogram 12 Lead   Result Value    Ventricular Rate 71    Atrial Rate 71    OK Interval 146    QRS Duration 106    QT Interval 408    QTC Calculation(Bazett) 443    P Axis 69    R Axis 49    T Axis 26    QRS Count 12    Q Onset 211    P Onset 138    P Offset 197    T Offset 415    QTC Fredericia 431    Narrative    Normal sinus rhythm with sinus arrhythmia  Low voltage QRS  Cannot rule out Anterior infarct , age undetermined  Abnormal ECG  When compared with ECG of 02-OCT-2024 15:21,  Premature ventricular complexes are no longer Present  Premature atrial complexes are no longer Present  Confirmed by Thomas Dawkins ([id number]) on 11/27/2024 3:50:13 PM         Echo:  Echo Results:  No results found for this or any previous visit from the past 365 days.       Ejection Fractions:  No results found for: \"EF\"    Cath:  PCI DIANA Stent- Coronary 11/21/2024    Narrative  [facility], Cath Lab, [address] [place] [address]    Cardiovascular Catheterization Report    Patient Name:      RUBIO NARAYAN      Performing Physician:  [id number] Shady Bhatia MD  Study Date:        11/21/2024   "          Verifying Physician:   [id number] Shady Bhatia MD  MRN/PID:           [id number]              Cardiologist/Co-Scrub:  Accession#:        [id number]          Ordering Provider:     [id number] GREGORY PALENCIALESLINIKUNJ  Date of Birth/Age: 1960 / 64 years Cardiologist:  Gender:            M                     Fellow:                [id number] Luis SR  Encounter#:        [id number]            Surgeon:      Study:            PCI - Percutaneous Coronary Intervention  Additional Study: IVUS - Intravascular Ultrasound      Indications:  RUBIO NARAYAN is a 64 year old male who presents with coronary artery disease, dyslipidemia, hypertension and an anginal equivalent chest pain assessment (i.e. dyspnea on exertion believed to be from ischemia). Stable angina.    Appropriate Use Criteria:  Intermediate-risk noninvasive findings in a medically managed patient with worsening/limiting symptoms and worsening findings; AUC score = 7.  Medical History:  Stress test performed: Yes. Stress test type: Stress Nuclear. Stress result:  Positive. CTA performed: No. Agatston accessed: No. LVEF Assessed: Yes.  Cardiac arrest: No.  Cardiac surgical consult: No.  Cardiovascular instability: No.  Frailty status of patient entering lab: 4 = Vulnerable.      Coronary Angiography:  The coronary circulation is right dominant.    Coronary Interventions:  Angiography reveals a 100% stenosis of the mid and distal right coronary artery coronary artery. Pre-intervention LUIGI flow was 0. Percutaneous coronary intervention was performed within the mid and distal right coronary artery. The stenosis was successfully reduced from 100% to <10%. Post-intervention LUIGI flow was 3.    Coronary Intervention Comments:  After informed consent was obtained, the patient brought to the cardiac catheterization laboratory. Timeouts performed. The right wrist and the right groin were prepped draped usual fashion. 2% Xylocaine was demonstrated locally.  Ultrasound guidance with a Seldinger technique was used to reduce a 7 Chinese sheath in the right radial artery. We had some difficulty with aspiration. We are able to introduce 2.5 mg of verapamil. A versa core wire was used to advance a 7 Chinese 3D right into the forearm. We met resistance. The versa core wire was removed and a 300 cm BMW wire was advanced into the ascending aorta. Balloon assisted tracking was performed with a 2.5 x 30 mm balloon. Ultrasound-guided micropuncture kit was used to introduce a 6 Chinese 45 cm sheath into the right femoral artery. A 6 Chinese 90 cm EBU 4 guiding catheter is used from the groin.    Heparin was given for an ACT greater than 300 during the entire case. ACT's were checked every 20 minutes. Baseline angiograms were performed. A Nanda blue wire was used through the EBU 4 90 cm guiding catheter to wire the LAD. A run-through wire with a Corsair pro was advanced through the 3D right guide into the mid RCA. The microcatheter was advanced and the run-through wire was removed. A Mongo wire was used to wire into the posterolateral branch. The microcatheter was advanced. We are uncertain if this wire was true lumen or subintimal. We pulled this wire back and a Gwendolyn next second was used to traverse true lumen distally. This was confirmed with retrograde angiography. The microcatheter was advanced and the run-through wire was readvanced. The microcatheter was removed. Attempts to advance this dislocated wire attempted revascularization of the PDA proved unsuccessful. Predilation was done with a 2.0 x 30 mm balloon. This allowed us to advance a Sasuke dual-lumen microcatheter. A  Mongo wire was used to gain purchase in the PDA. We are unable to enter true lumen distally. The cystic it was removed and the microcatheter was advanced over the Mongo wire. This wire was exchanged for a Gwendolyn next first which was used to enter true lumen in the midportion of the PDA. This was confirmed with  ipsilateral angiography. The microcatheter was advanced. The Gwendolyn next first was removed and a BMW wire was advanced into the PDA.    Balloon angioplasty of the PDA was done with a 2.0 x 30 mm balloon. Intracoronary nitroglycerin was given. A Snapguide cross HD IVUS was advanced and interrogation was performed. This demonstrated a distal reference vessel of 2.0-2.25. There was fibrocalcific disease throughout the RCA. The landing zones were obtained. Additional predilation was done with a 2.5 x 20 mm NC balloon at 12 to 16 siomara. We advanced a 2.25 x 38 mm [place] frontier stent was was deployed at 12 siomara. We pulled a BMW wire. A 3.0 x 48 mm Synergy stent was carefully placed and deployed at nominal pressures. Postdilatation was done with a 2.5 NC balloon at 16 to 22 siomara. A 3.5 x 20 mm NC balloon was used for postdilatation at 16 to 22 siomara. Intracoronary nitroglycerin was given. The IVUS catheter was readvanced. This demonstrated some mild underexpansion in the distal stent distal to the PDA. A 3.0 x 27 mm NC balloon was used for postdilatation at 18 siomara for 30 seconds.    Final angiography demonstrated excellent result. There was no residual stenosis there was LUIGI-3 flow down the entire RCA system. There was a controlled dissection plane in the PDA with LUIGI-3 flow. The guiding catheter was removed over a J-wire. TR band was used to achieve hemostasis of the right radial artery. Femoral angiography was performed and a 6 Azeri Angio-Seal device was used to achieve hemostasis of the right femoral artery. The patient tolerated the procedure without difficulty.    Hemo Personnel:  +--------------------+---------+  Name                Duty       +--------------------+---------+  Shady Bhatia MD 1  +--------------------+---------+      Hemodynamic Pressures:    +----+----------------------+----------+-------------+--------------+---------+  Site      Date Time       Phase NameSystolic  mmHgDiastolic mmHgMean mmHg  +----+----------------------+----------+-------------+--------------+---------+    AO11/21/2024 11:53:42 AM  AIR REST          117            75       94  +----+----------------------+----------+-------------+--------------+---------+    AO11/21/2024 11:53:42 AM  AIR REST            0             0        0  +----+----------------------+----------+-------------+--------------+---------+      Complications:  No in-lab complications observed.    Cardiac Cath Post Procedure Notes:  Post Procedure Diagnosis: CAD.  Blood Loss:               Estimated blood loss during the procedure was < 10  mls.  Specimens Removed:        Number of specimen(s) removed: none.      Recommendations:  Maximize medical therapy.  Anti-platelet therapy with Dual antiplatelet therapy.  Lipid lowering agent or Statin therapy.  Further recommendations per Dr. Gillombardo.    ____________________________________________________________________________________  CONCLUSIONS:  1. Successful IVUS guided  PCI of the RCA and PDA with Champ frontier and Synergy XD stent.    ICD 10 Codes:  Atherosclerotic heart disease of native coronary artery with other forms of angina pectoris-I25.118    CPT Codes:  Revasc of  stent/angio/atherec, Right Coronary single Vessel (PCI)-17848.RC; Revasc of , Right Coronary ea add'l Artery (PCI)-89909.RC; IVUS, Right Coronary initial Vessel (PCI)-28720.    [id number] Shady Bhatia MD  Performing Physician  Electronically signed by [id number] Shady Bhatia MD on 11/21/2024 at 2:17:41 PM          ** Final **        Stress Test:  Stress Results:  No results found for this or any previous visit from the past 365 days.       Cardiac Imaging:  Electrocardiogram 12 Lead  Normal sinus rhythm with sinus arrhythmia  Low voltage QRS  Cannot rule out Anterior infarct , age undetermined  Abnormal ECG  When compared with ECG of 02-OCT-2024 15:21,  Premature ventricular complexes are no  longer Present  Premature atrial complexes are no longer Present  Confirmed by Thomas Dawkins ([id number]) on 11/27/2024 3:50:13 PM      Assessment/Plan   ***  Please see detailed problem based assessment / plan below    # Cardiovascular Health Maintenance  - Physical Activity: Encourage 10-15K steps per day  - Healthy Diet: Avoid high fat and high sodium foods (processed meats / fast foods)  --- consider a mediterranean or DASH diet, emphasizing vegetables, fruits, whole grains, lean proteins  - Avoidance of smoking and smoke exposure    {Assess/Plan SmartLinks:05032}        Time Spent: I spent *** minutes reviewing medical testing, obtaining medical history and counselling and educating on diagnosis and documenting clinical encounter.   C. Barton Gillombardo, MD  Interventional Cardiology  Pager: [phone]

## 2025-02-06 ENCOUNTER — APPOINTMENT (OUTPATIENT)
Dept: UROLOGY | Facility: CLINIC | Age: 65
End: 2025-02-06
Payer: MEDICAID

## 2025-02-11 ENCOUNTER — TELEPHONE (OUTPATIENT)
Dept: CARDIOLOGY | Facility: CLINIC | Age: 65
End: 2025-02-11
Payer: MEDICAID

## 2025-02-12 DIAGNOSIS — Z95.5 S/P DRUG ELUTING CORONARY STENT PLACEMENT: ICD-10-CM

## 2025-02-12 RX ORDER — ATORVASTATIN CALCIUM 80 MG/1
80 TABLET, FILM COATED ORAL EVERY EVENING
Qty: 90 TABLET | Refills: 0 | Status: SHIPPED | OUTPATIENT
Start: 2025-02-12 | End: 2026-02-12

## 2025-02-17 ENCOUNTER — TELEPHONE (OUTPATIENT)
Dept: CARDIOLOGY | Facility: CLINIC | Age: 65
End: 2025-02-17
Payer: MEDICAID

## 2025-02-17 DIAGNOSIS — Z95.5 S/P DRUG ELUTING CORONARY STENT PLACEMENT: ICD-10-CM

## 2025-02-17 DIAGNOSIS — K21.9 GERD (GASTROESOPHAGEAL REFLUX DISEASE): ICD-10-CM

## 2025-02-17 RX ORDER — PANTOPRAZOLE SODIUM 40 MG/1
40 TABLET, DELAYED RELEASE ORAL
Qty: 90 TABLET | Refills: 1 | Status: SHIPPED | OUTPATIENT
Start: 2025-02-17 | End: 2025-02-19 | Stop reason: SDUPTHER

## 2025-02-17 RX ORDER — CLOPIDOGREL BISULFATE 75 MG/1
75 TABLET ORAL DAILY
Qty: 90 TABLET | Refills: 1 | Status: SHIPPED | OUTPATIENT
Start: 2025-02-17 | End: 2025-02-19 | Stop reason: SDUPTHER

## 2025-02-19 DIAGNOSIS — K21.9 GERD (GASTROESOPHAGEAL REFLUX DISEASE): ICD-10-CM

## 2025-02-19 DIAGNOSIS — Z95.5 S/P DRUG ELUTING CORONARY STENT PLACEMENT: ICD-10-CM

## 2025-02-19 RX ORDER — CLOPIDOGREL BISULFATE 75 MG/1
75 TABLET ORAL DAILY
Qty: 90 TABLET | Refills: 1 | Status: SHIPPED | OUTPATIENT
Start: 2025-02-19 | End: 2025-02-19

## 2025-02-19 RX ORDER — ATORVASTATIN CALCIUM 80 MG/1
80 TABLET, FILM COATED ORAL EVERY EVENING
Qty: 90 TABLET | Refills: 3 | Status: SHIPPED | OUTPATIENT
Start: 2025-02-19 | End: 2026-02-19

## 2025-02-19 RX ORDER — ASPIRIN 81 MG/1
81 TABLET ORAL DAILY
Qty: 90 TABLET | Refills: 3 | Status: SHIPPED | OUTPATIENT
Start: 2025-02-19 | End: 2026-02-19

## 2025-02-19 RX ORDER — CLOPIDOGREL BISULFATE 75 MG/1
75 TABLET ORAL DAILY
Qty: 90 TABLET | Refills: 3 | Status: SHIPPED | OUTPATIENT
Start: 2025-02-19 | End: 2026-02-19

## 2025-02-19 RX ORDER — PANTOPRAZOLE SODIUM 40 MG/1
40 TABLET, DELAYED RELEASE ORAL
Qty: 90 TABLET | Refills: 3 | Status: SHIPPED | OUTPATIENT
Start: 2025-02-19 | End: 2026-02-19

## 2025-03-25 RX ORDER — METOPROLOL SUCCINATE 25 MG/1
25 TABLET, EXTENDED RELEASE ORAL DAILY
Qty: 90 TABLET | Refills: 3 | Status: SHIPPED | OUTPATIENT
Start: 2025-03-25

## 2025-05-16 DIAGNOSIS — I51.9 LV DYSFUNCTION: ICD-10-CM

## 2025-05-16 DIAGNOSIS — I25.10 CORONARY ARTERY DISEASE INVOLVING NATIVE CORONARY ARTERY OF NATIVE HEART WITHOUT ANGINA PECTORIS: ICD-10-CM

## 2025-05-19 RX ORDER — LISINOPRIL 10 MG/1
10 TABLET ORAL DAILY
Qty: 90 TABLET | Refills: 3 | Status: SHIPPED | OUTPATIENT
Start: 2025-05-19

## 2025-06-26 ENCOUNTER — TELEPHONE (OUTPATIENT)
Dept: CARDIOLOGY | Facility: CLINIC | Age: 65
End: 2025-06-26
Payer: MEDICAID

## (undated) DEVICE — CLOSURE DEVICE, VASCULAR, ANGIO-SEAL, VIP, 6FR, LF

## (undated) DEVICE — Device

## (undated) DEVICE — GUIDEWIRE, STRAIGHT, HI-TORQUE BALANCE MIDDLEWEIGHT, 0.014 IN X 300 CM, HYDROCOAT

## (undated) DEVICE — CATHETER, BALLOON DILATION, EUPHORA SEMICOMPLIANT 3.00  X 15 MM X 142CM

## (undated) DEVICE — TR BAND, RADIAL COMPRESSION, STANDARD, 24CM

## (undated) DEVICE — MICROCATHETER, ASAHI CORSAIR, 135CM

## (undated) DEVICE — CATHETER, OPTICROSS 6HD, 3.6F X 135CM

## (undated) DEVICE — CATHETER, BALLOON, NC EUPHORA NONCOMPLIANT 2.5 X 20 X 142CM

## (undated) DEVICE — GUIDEWIRE, RUN THROUGH WIRE, 180CM

## (undated) DEVICE — GUIDEWIRE, INQWIRE, 3MM J, .035, 260

## (undated) DEVICE — CATHETER, BALLOON, NC EUPHORA NONCOMPLIANT 3.0 X 27 X 142CM

## (undated) DEVICE — GUIDEWIRE, GO2WIRE, STEERABLE, 0.035 X 260CM, FLOPPY STRAIGHT

## (undated) DEVICE — CATHETER, BALLOON DILATION, EUPHORA SEMICOMPLIANT 2.0  X 30 MM X 142CM

## (undated) DEVICE — INTRODUCER, GLIDESHEATH SLENDER A-KIT, 7FR 10CM

## (undated) DEVICE — INTRODUCER, MICRO, 4FR, 7CM ECHO

## (undated) DEVICE — SHEATH, BRITE TIP 6 X 45

## (undated) DEVICE — CATHETER, ANGIO, IMPULSE, FL3.5, 5 FR X 100 CM

## (undated) DEVICE — CATHETER, SASUKE, DBL LUMEN, 3.3FR X 145CM

## (undated) DEVICE — CATHETER, ANGIO, IMPULSE, FR4, 5 FR X 100 CM

## (undated) DEVICE — GUIDEWIRE, SION BLUE PTCA, 0.041 X 190CM, STRT

## (undated) DEVICE — CATHETER, BALLOON DILATION, EUPHORA SEMICOMPLIANT 2.50  X 30 MM X 142CM

## (undated) DEVICE — PULLBACK SLED, F/G ASSY, SNGL PACK MD5, DISP, STRL

## (undated) DEVICE — GUIDEWIRE, GAIA NEXT 2, 0.36 X 190CM

## (undated) DEVICE — GUIDEWIRE, STRAIGHT, HI-TORQUE BALANCE MIDDLEWEIGHT, 0.014 IN X 190 CM, HYDROCOAT

## (undated) DEVICE — CATHETER, BALLOON, NC EUPHORA NONCOMPLIANT 3.5 X 20 X 142CM

## (undated) DEVICE — GUIDEWIRE, ASAHI GLADIUS MONGO, 0.014 X 190CM, STRAIGHT

## (undated) DEVICE — CATHETER, ANGIO, IMPULSE, FL4, 5 FR X 100 CM